# Patient Record
Sex: FEMALE | Race: WHITE | Employment: OTHER | URBAN - METROPOLITAN AREA
[De-identification: names, ages, dates, MRNs, and addresses within clinical notes are randomized per-mention and may not be internally consistent; named-entity substitution may affect disease eponyms.]

---

## 2017-07-26 ENCOUNTER — OFFICE VISIT (OUTPATIENT)
Dept: FAMILY MEDICINE | Facility: CLINIC | Age: 37
End: 2017-07-26
Payer: MEDICAID

## 2017-07-26 VITALS
WEIGHT: 237.6 LBS | HEIGHT: 66 IN | DIASTOLIC BLOOD PRESSURE: 94 MMHG | HEART RATE: 70 BPM | SYSTOLIC BLOOD PRESSURE: 132 MMHG | TEMPERATURE: 98.1 F | BODY MASS INDEX: 38.18 KG/M2

## 2017-07-26 DIAGNOSIS — E06.3 HASHIMOTO'S THYROIDITIS: Primary | ICD-10-CM

## 2017-07-26 DIAGNOSIS — I10 BENIGN ESSENTIAL HYPERTENSION: ICD-10-CM

## 2017-07-26 LAB
ANION GAP SERPL CALCULATED.3IONS-SCNC: 6 MMOL/L (ref 3–14)
BUN SERPL-MCNC: 19 MG/DL (ref 7–30)
CALCIUM SERPL-MCNC: 8.8 MG/DL (ref 8.5–10.1)
CHLORIDE SERPL-SCNC: 106 MMOL/L (ref 94–109)
CO2 SERPL-SCNC: 27 MMOL/L (ref 20–32)
CREAT SERPL-MCNC: 0.98 MG/DL (ref 0.52–1.04)
GFR SERPL CREATININE-BSD FRML MDRD: 64 ML/MIN/1.7M2
GLUCOSE SERPL-MCNC: 90 MG/DL (ref 70–99)
POTASSIUM SERPL-SCNC: 4.4 MMOL/L (ref 3.4–5.3)
SODIUM SERPL-SCNC: 139 MMOL/L (ref 133–144)
TSH SERPL DL<=0.005 MIU/L-ACNC: 1.45 MU/L (ref 0.4–4)

## 2017-07-26 PROCEDURE — 36415 COLL VENOUS BLD VENIPUNCTURE: CPT | Performed by: FAMILY MEDICINE

## 2017-07-26 PROCEDURE — 99213 OFFICE O/P EST LOW 20 MIN: CPT | Performed by: FAMILY MEDICINE

## 2017-07-26 PROCEDURE — 80048 BASIC METABOLIC PNL TOTAL CA: CPT | Performed by: FAMILY MEDICINE

## 2017-07-26 PROCEDURE — 84443 ASSAY THYROID STIM HORMONE: CPT | Performed by: FAMILY MEDICINE

## 2017-07-26 RX ORDER — THYROID 30 MG/1
30 TABLET ORAL DAILY
Qty: 60 TABLET | Refills: 3 | Status: SHIPPED | OUTPATIENT
Start: 2017-07-26 | End: 2017-08-23

## 2017-07-26 RX ORDER — LOSARTAN POTASSIUM 25 MG/1
25 TABLET ORAL DAILY
Qty: 90 TABLET | Refills: 1 | Status: SHIPPED | OUTPATIENT
Start: 2017-07-26 | End: 2018-09-25

## 2017-07-26 NOTE — MR AVS SNAPSHOT
"              After Visit Summary   7/26/2017    Yumi Bhatti    MRN: [id number]           Patient Information     Date Of Birth          1980        Visit Information        Provider Department      7/26/2017 9:00 AM Ora Ivy MD [facility]        Today's Diagnoses     Hashimoto's thyroiditis    -  1    Benign essential hypertension          Care Instructions    Start the armour thyroid if it is too expensive I will send in the levothyroxine   Start the losartan and stop the lisinopril/zestril.  Recheck the blood pressure and the thyroid in 3 and 1/2 weeks   I will put in the lab orders for the thyroid . You can do a nurse only or pharmacy visit for the blood pressure           Follow-ups after your visit        Who to contact     Normal or non-critical lab and imaging results will be communicated to you by [employer], letter or phone within 4 business days after the clinic has received the results. If you do not hear from us within 7 days, please contact the clinic through [employer] or phone. If you have a critical or abnormal lab result, we will notify you by phone as soon as possible.  Submit refill requests through [employer] or call your pharmacy and they will forward the refill request to us. Please allow 3 business days for your refill to be completed.          If you need to speak with a  for additional information , please call: [phone]             Additional Information About Your Visit        [employer] Information     [employer] lets you send messages to your doctor, view your test results, renew your prescriptions, schedule appointments and more. To sign up, go to [email or web address] . Click on \"Log in\" on the left side of the screen, which will take you to the Welcome page. Then click on \"Sign up Now\" on the right side of the page.     You will be asked to enter the access code listed below, as well as some personal information. Please follow the directions " "to create your username and password.     Your access code is: [email or web address]  Expires: 10/24/2017 10:01 AM     Your access code will  in 90 days. If you need help or a new code, please call your [place] clinic or [phone].        Care EveryWhere ID     This is your Care EveryWhere ID. This could be used by other organizations to access your [place] medical records  [id number]        Your Vitals Were     Pulse Temperature Height BMI (Body Mass Index)          70 98.1  F (36.7  C) (Tympanic) 5' 6.25\" (1.683 m) 38.06 kg/m2         Blood Pressure from Last 3 Encounters:   17 (!) 132/94   16 130/88   06/10/16 (!) 156/100    Weight from Last 3 Encounters:   17 237 lb 9.6 oz (107.8 kg)   16 231 lb 3.2 oz (104.9 kg)   06/10/16 227 lb (103 kg)              We Performed the Following     BASIC METABOLIC PANEL     TSH with free T4 reflex          Today's Medication Changes          These changes are accurate as of: 17 10:01 AM.  If you have any questions, ask your nurse or doctor.               Start taking these medicines.        Dose/Directions    losartan 25 MG tablet   Commonly known as:  COZAAR   Used for:  Benign essential hypertension   Started by:  Ora Ivy MD        Dose:  25 mg   Take 1 tablet (25 mg) by mouth daily   Quantity:  90 tablet   Refills:  1       thyroid 30 MG tablet   Commonly known as:  ARMOUR   Used for:  Hashimoto's thyroiditis   Started by:  Ora Ivy MD        Dose:  30 mg   Take 1 tablet (30 mg) by mouth daily Take 1-2 tablets per day as directed   Quantity:  60 tablet   Refills:  3            Where to get your medicines      These medications were sent to [facility] - [address]  [address] [address]     Phone:  [phone]     losartan 25 MG tablet    thyroid 30 MG tablet                Primary Care Provider    None Specified       No primary provider on file.        Equal Access to " Services     [facility]: Hadii williams tee juan Soadelaidaali, waaxda luqadaha, qaybta kaalmada adegilmerzay, aleja parkerblackyahir flores . So [facility] [phone].    ATENCIÓN: Si jackie viera, tiene a zuleta disposición servicios gratuitos de asistencia lingüística. Llame al [phone].    We comply with applicable federal civil rights laws and [place] laws. We do not discriminate on the basis of race, color, national origin, age, disability sex, sexual orientation or gender identity.            Thank you!     Thank you for choosing [facility]  for your care. Our goal is always to provide you with excellent care. Hearing back from our patients is one way we can continue to improve our services. Please take a few minutes to complete the written survey that you may receive in the mail after your visit with us. Thank you!             Your Updated Medication List - Protect others around you: Learn how to safely use, store and throw away your medicines at www.disposemymeds.org.          This list is accurate as of: 7/26/17 10:01 AM.  Always use your most recent med list.                   Brand Name Dispense Instructions for use Diagnosis    LEVOTHYROXINE SODIUM PO      Take 50 mcg by mouth        * lisinopril 5 MG tablet    PRINIVIL/ZESTRIL    30 tablet    Take 1 tablet (5 mg) by mouth daily    Elevated BP       * lisinopril 10 MG tablet    PRINIVIL/ZESTRIL    90 tablet    Take 1 tablet (10 mg) by mouth daily    Benign essential hypertension       losartan 25 MG tablet    COZAAR    90 tablet    Take 1 tablet (25 mg) by mouth daily    Benign essential hypertension       norethindrone 0.35 MG per tablet    MICRONOR    60 tablet    Take 1 tablet (0.35 mg) by mouth daily    Pelvic pain in female       thyroid 30 MG tablet    ARMOUR    60 tablet    Take 1 tablet (30 mg) by mouth daily Take 1-2 tablets per day as directed    Hashimoto's thyroiditis       * Notice:  This list has 2 medication(s) that are the  same as other medications prescribed for you. Read the directions carefully, and ask your doctor or other care provider to review them with you.

## 2017-07-26 NOTE — PATIENT INSTRUCTIONS
Start the armour thyroid if it is too expensive I will send in the levothyroxine   Start the losartan and stop the lisinopril/zestril.  Recheck the blood pressure and the thyroid in 3 and 1/2 weeks   I will put in the lab orders for the thyroid . You can do a nurse only or pharmacy visit for the blood pressure

## 2017-07-26 NOTE — PROGRESS NOTES
SUBJECTIVE:                                                    Yumi Bhatti is a 36 year old female who presents to clinic today for the following health issues:    Had not been taking blood pressure medication, was getting headaches. Started medication again headaches are gone.   Lives in [place], comes back once a year for a few weeks, works for a [employer].   Also seen at clinic in [place] [facility] [place] [facility].     Hypertension Follow-up    Outpatient blood pressures are being checked at home.  Results are 155/105.    Low Salt Diet: low salt  BP Readings from Last 6 Encounters:   07/26/17 (!) 132/94   06/14/16 130/88   06/10/16 (!) 156/100   06/05/15 116/70   05/29/15 (!) 148/108   09/16/14 122/70     Hypothyroidism Follow-up    Since last visit, patient describes the following symptoms: Stopped taking medication after normal labs last year     Has hashimotos  Needs labs and would be interested in starting armour as she is gaining weight and her blood pressure control has been poorer         Component      Latest Ref Rng & Units 6/14/2016   TSH      0.40 - 4.00 mU/L 1.53         Amount of exercise or physical activity: 3 days per week    Problems taking medications regularly: Yes,  problems remembering to take    Medication side effects: coughing at night sometimes.     Diet: regular (no restrictions) and low salt      Problem list and histories reviewed & adjusted, as indicated.  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   [place] is available there         Blood pressure started after the 3rd son and then with 4th child had preeclampsia and she has been on lisinopril she has been back on her medication and it has improved   She has the cough when she takes the lisinopril and it goes away  when she stops it or takes the zestril from [place]    Patient Active Problem List   Diagnosis     Obesity     Sacroiliitis, not elsewhere classified (H)     Hemorrhoids     Insomnia     CARDIOVASCULAR SCREENING; LDL GOAL LESS THAN 160     Health Care Home     [facility] recommends twice weekly BPP's with delivery at 37 weeks=13 with TL     Bulge of lumbar disc without myelopathy     CKD (chronic kidney disease) stage 2, GFR 60-89 ml/min     Hypertension goal BP (blood pressure) < 140/90     Migraine     Coccydynia     Hemorrhoids in pregnancy with baby delivered     Major depression in complete remission (H)     Dysuria     Essential hypertension with goal blood pressure less than 140/90     Hypothyroidism, unspecified type     Past Surgical History:   Procedure Laterality Date     C/SECTION, LOW TRANSVERSE  2006     C/SECTION, LOW TRANSVERSE  2007    , Low Transverse     C/SECTION, LOW TRANSVERSE  06/17/10    , Low Transverse     C/SECTION, LOW TRANSVERSE  13    ERCS with TL     HC TOOTH EXTRACTION W/FORCEP      age 22.     LAPAROSCOPIC SALPINGECTOMY  3/1/2012    Procedure:LAPAROSCOPIC SALPINGECTOMY; Laparoscopic Left Salpingectomy; Surgeon:LUIS GARCIA; Location:[facility] OR     TUBAL LIGATION  14     TUBAL/ECTOPIC PREGNANCY         Social History   Substance Use Topics     Smoking status: Never Smoker     Smokeless tobacco: Never Used      Comment: no smokers in the household     Alcohol use Yes      Comment: none since pg     Family History   Problem Relation Age of Onset     DIABETES Mother      adult     Depression Mother      Hypertension Mother      CEREBROVASCULAR DISEASE Mother      has had three strokes in  age 30's.     Alcohol/Drug Mother      alcoholic     Psychotic Disorder Mother      Alcohol/Drug Father      alcoholic and drug abuse     Depression Maternal Grandmother      Alcohol/Drug Maternal Grandmother      DIABETES Maternal Grandfather      Depression  "Maternal Grandfather      Arthritis Maternal Grandfather      Alcohol/Drug Maternal Grandfather      Depression Brother      Alcohol/Drug Brother      has seven half  brothers and all have alcohol and drug abuse.     Alcohol/Drug Paternal Grandmother      Alcohol/Drug Paternal Grandfather      Psychotic Disorder Brother              ROS:  Constitutional, HEENT, cardiovascular, pulmonary, gi and gu systems are negative, except as otherwise noted.      OBJECTIVE:                                                    BP (!) 132/94 (BP Location: Right arm, Patient Position: Chair, Cuff Size: Adult Large)  Pulse 70  Temp 98.1  F (36.7  C) (Tympanic)  Ht 5' 6.25\" (1.683 m)  Wt 237 lb 9.6 oz (107.8 kg)  BMI 38.06 kg/m2 Body mass index is 38.06 kg/(m^2).   GENERAL APPEARANCE: healthy, alert and no distress  HENT: ear canals and TM's normal and nose and mouth without ulcers or lesions  NECK: no adenopathy, no asymmetry, masses, or scars and thyroid normal to palpation  RESP: lungs clear to auscultation - no rales, rhonchi or wheezes  CV: regular rates and rhythm, normal S1 S2, no S3 or S4 and no murmur, click or rub  ABDOMEN: soft, nontender, without hepatosplenomegaly or masses, bowel sounds normal and liver span normal to percussion  PSYCH: mentation appears normal and affect normal/bright  MENTAL STATUS EXAM:  Appearance/Behavior: No apparent distress, Neatly groomed and Dressed appropriately for weather  Speech: Normal  Mood/Affect: normal affect  Insight: Adequate         ASSESSMENT/PLAN:                                                      1. Hashimoto's thyroiditis  Results for orders placed or performed in visit on 07/26/17   BASIC METABOLIC PANEL   Result Value Ref Range    Sodium 139 133 - 144 mmol/L    Potassium 4.4 3.4 - 5.3 mmol/L    Chloride 106 94 - 109 mmol/L    Carbon Dioxide 27 20 - 32 mmol/L    Anion Gap 6 3 - 14 mmol/L    Glucose 90 70 - 99 mg/dL    Urea Nitrogen 19 7 - 30 mg/dL    Creatinine 0.98 0.52 " - 1.04 mg/dL    GFR Estimate 64 >60 mL/min/1.7m2    GFR Estimate If Black 78 >60 mL/min/1.7m2    Calcium 8.8 8.5 - 10.1 mg/dL   TSH with free T4 reflex   Result Value Ref Range    TSH 1.45 0.40 - 4.00 mU/L       - TSH with free T4 reflex  - thyroid (ARMOUR) 30 MG tablet; Take 1 tablet (30 mg) by mouth daily Take 1-2 tablets per day as directed  Dispense: 60 tablet; Refill: 3    2. Benign essential hypertension  Patient Instructions   Start the armour thyroid if it is too expensive I will send in the levothyroxine   Start the losartan and stop the lisinopril/zestril.  Recheck the blood pressure and the thyroid in 3 and 1/2 weeks   I will put in the lab orders for the thyroid . You can do a nurse only or pharmacy visit for the blood pressure       - BASIC METABOLIC PANEL  - losartan (COZAAR) 25 MG tablet; Take 1 tablet (25 mg) by mouth daily  Dispense: 90 tablet; Refill: 1  . reports that she has never smoked. She has never used smokeless tobacco.        Weight management plan: Discussed healthy diet and exercise guidelines and patient will follow up in 12 months in clinic to re-evaluate.  rOa Ivy M.D.  [facility]

## 2017-07-27 ASSESSMENT — PATIENT HEALTH QUESTIONNAIRE - PHQ9: SUM OF ALL RESPONSES TO PHQ QUESTIONS 1-9: 4

## 2017-08-14 ENCOUNTER — OFFICE VISIT (OUTPATIENT)
Dept: FAMILY MEDICINE | Facility: CLINIC | Age: 37
End: 2017-08-14
Payer: MEDICAID

## 2017-08-14 VITALS
BODY MASS INDEX: 38.77 KG/M2 | TEMPERATURE: 99.3 F | HEART RATE: 76 BPM | WEIGHT: 242 LBS | SYSTOLIC BLOOD PRESSURE: 151 MMHG | DIASTOLIC BLOOD PRESSURE: 102 MMHG

## 2017-08-14 DIAGNOSIS — H65.197: Primary | ICD-10-CM

## 2017-08-14 PROCEDURE — 99213 OFFICE O/P EST LOW 20 MIN: CPT | Performed by: PHYSICIAN ASSISTANT

## 2017-08-14 RX ORDER — CEFDINIR 300 MG/1
300 CAPSULE ORAL 2 TIMES DAILY
Qty: 20 CAPSULE | Refills: 0 | Status: SHIPPED | OUTPATIENT
Start: 2017-08-14 | End: 2017-08-23

## 2017-08-14 NOTE — MR AVS SNAPSHOT
"              After Visit Summary   2017    Yumi Bhatti    MRN: [id number]           Patient Information     Date Of Birth          1980        Visit Information        Provider Department      2017 11:40 AM Nan Jarrett PA-C [facility]        Today's Diagnoses     Other recurrent acute nonsuppurative otitis media    -  1       Follow-ups after your visit        Who to contact     Normal or non-critical lab and imaging results will be communicated to you by [employer], letter or phone within 4 business days after the clinic has received the results. If you do not hear from us within 7 days, please contact the clinic through [employer] or phone. If you have a critical or abnormal lab result, we will notify you by phone as soon as possible.  Submit refill requests through [employer] or call your pharmacy and they will forward the refill request to us. Please allow 3 business days for your refill to be completed.          If you need to speak with a  for additional information , please call: [phone]             Additional Information About Your Visit        [employer] Information     [employer] lets you send messages to your doctor, view your test results, renew your prescriptions, schedule appointments and more. To sign up, go to [email or web address] . Click on \"Log in\" on the left side of the screen, which will take you to the Welcome page. Then click on \"Sign up Now\" on the right side of the page.     You will be asked to enter the access code listed below, as well as some personal information. Please follow the directions to create your username and password.     Your access code is: [email or web address]  Expires: 10/24/2017 10:01 AM     Your access code will  in 90 days. If you need help or a new code, please call your [facility] or [phone].        Care EveryWhere ID     This is your Care EveryWhere ID. This could be used by other organizations to " access your [place] medical records  [id number]        Your Vitals Were     Pulse Temperature BMI (Body Mass Index)             76 99.3  F (37.4  C) (Tympanic) 38.77 kg/m2          Blood Pressure from Last 3 Encounters:   08/14/17 (!) 151/102   07/26/17 (!) 132/94   06/14/16 130/88    Weight from Last 3 Encounters:   08/14/17 242 lb (109.8 kg)   07/26/17 237 lb 9.6 oz (107.8 kg)   06/14/16 231 lb 3.2 oz (104.9 kg)              Today, you had the following     No orders found for display         Today's Medication Changes          These changes are accurate as of: 8/14/17 11:57 AM.  If you have any questions, ask your nurse or doctor.               Start taking these medicines.        Dose/Directions    cefdinir 300 MG capsule   Commonly known as:  OMNICEF   Used for:  Other recurrent acute nonsuppurative otitis media   Started by:  Nan Jarrett PA-C        Dose:  300 mg   Take 1 capsule (300 mg) by mouth 2 times daily for 10 days   Quantity:  20 capsule   Refills:  0            Where to get your medicines      These medications were sent to [facility] - [address]  [address] [place] [address]     Phone:  [phone]     cefdinir 300 MG capsule                Primary Care Provider    None Specified       No primary provider on file.        Equal Access to Services     [facility] AH: Hadii williams martinez Sonancie, waaxda luqadaha, qaybta kaalmada john, aleja flores . So [facility] [phone].    ATENCIÓN: Si habla español, tiene a zuleta disposición servicios gratuitos de asistencia lingüística. Llame al [phone].    We comply with applicable federal civil rights laws and [place] laws. We do not discriminate on the basis of race, color, national origin, age, disability sex, sexual orientation or gender identity.            Thank you!     [facility]. Our goal is always to  provide you with excellent care. Hearing back from our patients is one way we can continue to improve our services. Please take a few minutes to complete the written survey that you may receive in the mail after your visit with us. Thank you!             Your Updated Medication List - Protect others around you: Learn how to safely use, store and throw away your medicines at www.disposemymeds.org.          This list is accurate as of: 8/14/17 11:57 AM.  Always use your most recent med list.                   Brand Name Dispense Instructions for use Diagnosis    cefdinir 300 MG capsule    OMNICEF    20 capsule    Take 1 capsule (300 mg) by mouth 2 times daily for 10 days    Other recurrent acute nonsuppurative otitis media       LEVOTHYROXINE SODIUM PO      Take 50 mcg by mouth        * lisinopril 5 MG tablet    PRINIVIL/ZESTRIL    30 tablet    Take 1 tablet (5 mg) by mouth daily    Elevated BP       * lisinopril 10 MG tablet    PRINIVIL/ZESTRIL    90 tablet    Take 1 tablet (10 mg) by mouth daily    Benign essential hypertension       losartan 25 MG tablet    COZAAR    90 tablet    Take 1 tablet (25 mg) by mouth daily    Benign essential hypertension       norethindrone 0.35 MG per tablet    MICRONOR    60 tablet    Take 1 tablet (0.35 mg) by mouth daily    Pelvic pain in female       thyroid 30 MG tablet    ARMOUR    60 tablet    Take 1 tablet (30 mg) by mouth daily Take 1-2 tablets per day as directed    Hashimoto's thyroiditis       * Notice:  This list has 2 medication(s) that are the same as other medications prescribed for you. Read the directions carefully, and ask your doctor or other care provider to review them with you.

## 2017-08-14 NOTE — PROGRESS NOTES
SUBJECTIVE:  Yumi Bhatti is a 36 year old female who presents with the following concerns;              Symptoms: cc Present Absent Comment   Fever/Chills   x    Fatigue   x    Muscle Aches   x    Eye Irritation   x    Sneezing   x    Nasal Myke/Drg   x    Sinus Pressure/Pain   x    Loss of smell   x    Dental pain   x    Sore Throat   x    Swollen Glands   x    Ear Pain/Fullness  x     Cough   x    Wheeze   x    Chest Pain   x    Shortness of breath   x    Rash   x    Other   x      Symptom duration:  3 days    Sympom severity:  Mild - moderate    Treatments tried:  IBU   Contacts:  none       Medications updated and reviewed.  Past, family and surgical history is updated and reviewed in the record.    ROS:  Other than noted above, general, HEENT, respiratory, cardiac and gastrointestinal systems are negative.    OBJECTIVE:  BP (!) 151/102  Pulse 76  Temp 99.3  F (37.4  C) (Tympanic)  Wt 242 lb (109.8 kg)  BMI 38.77 kg/m2   GENERAL: Pleasant and interactive.  Alert and oriented x 3.  No acute distress.   HEENT: Eyes clear. Right TM and canal clear. Left TM with opacity and erythema of the TM.  SKIN:  Only benign skin findings. No unusual rashes or suspicious skin lesions noted. Nails appear normal.        Assessment:    Encounter Diagnosis   Name Primary?     Other recurrent acute nonsuppurative otitis media Yes     Plan:   Orders Placed This Encounter     cefdinir (OMNICEF) 300 MG capsule       Supportive therapy also discussed. Follow up if symptoms fail to improve or worsen.      The patient was in agreement with the plan today and had no questions or concerns prior to leaving the clinic.     Nan Jarrett PA-C

## 2017-08-14 NOTE — NURSING NOTE
"Chief Complaint   Patient presents with     Otalgia       Initial BP (!) 151/102  Pulse 76  Temp 99.3  F (37.4  C) (Tympanic)  Wt 242 lb (109.8 kg)  BMI 38.77 kg/m2 Estimated body mass index is 38.77 kg/(m^2) as calculated from the following:    Height as of 7/26/17: 5' 6.25\" (1.683 m).    Weight as of this encounter: 242 lb (109.8 kg).  Medication Reconciliation: complete     Annemarie Mendez CMA      "

## 2017-08-21 ENCOUNTER — OFFICE VISIT (OUTPATIENT)
Dept: FAMILY MEDICINE | Facility: OTHER | Age: 37
End: 2017-08-21
Payer: MEDICAID

## 2017-08-21 VITALS
RESPIRATION RATE: 16 BRPM | BODY MASS INDEX: 38.45 KG/M2 | TEMPERATURE: 97.7 F | OXYGEN SATURATION: 97 % | HEART RATE: 81 BPM | DIASTOLIC BLOOD PRESSURE: 100 MMHG | WEIGHT: 240 LBS | SYSTOLIC BLOOD PRESSURE: 146 MMHG

## 2017-08-21 DIAGNOSIS — I10 HYPERTENSION GOAL BP (BLOOD PRESSURE) < 140/90: ICD-10-CM

## 2017-08-21 DIAGNOSIS — E03.9 HYPOTHYROIDISM, UNSPECIFIED TYPE: ICD-10-CM

## 2017-08-21 DIAGNOSIS — H65.192 ACUTE MUCOID OTITIS MEDIA OF LEFT EAR: Primary | ICD-10-CM

## 2017-08-21 PROCEDURE — 99214 OFFICE O/P EST MOD 30 MIN: CPT | Performed by: PHYSICIAN ASSISTANT

## 2017-08-21 NOTE — MR AVS SNAPSHOT
"              After Visit Summary   8/21/2017    Yumi Bhatti    MRN: [id number]           Patient Information     Date Of Birth          1980        Visit Information        Provider Department      8/21/2017 10:30 AM Gerardo Layton PA-C [facility]        Today's Diagnoses     Acute mucoid otitis media of left ear    -  1      Care Instructions    Will start you on Augmentin to take twice daily for 10 days.  Continue with ibuprofen and use cool compresses over the ear.   Increase the Losartan to 50 mg daily instead of 25.  Follow up on Friday for a recheck of your ear and blood pressure.            Follow-ups after your visit        Who to contact     If you have questions or need follow up information about today's clinic visit or your schedule please contact [facility] directly at [phone].  Normal or non-critical lab and imaging results will be communicated to you by Beamlyhart, letter or phone within 4 business days after the clinic has received the results. If you do not hear from us within 7 days, please contact the clinic through Beamlyhart or phone. If you have a critical or abnormal lab result, we will notify you by phone as soon as possible.  Submit refill requests through NeoDiagnostix or call your pharmacy and they will forward the refill request to us. Please allow 3 business days for your refill to be completed.          Additional Information About Your Visit        MyChart Information     NeoDiagn[employer]ix lets you send messages to your doctor, view your test results, renew your prescriptions, schedule appointments and more. To sign up, go to [email or web address] . Click on \"Log in\" on the left side of the screen, which will take you to the Welcome page. Then click on \"Sign up Now\" on the right side of the page.     You will be asked to enter the access code listed below, as well as some personal information. Please follow the directions to create your username " and password.     Your access code is: [email or web address]  Expires: 10/24/2017 10:01 AM     Your access code will  in 90 days. If you need help or a new code, please call your [place] clinic or [phone].        Care EveryWhere ID     This is your Care EveryWhere ID. This could be used by other organizations to access your [place] medical records  [id number]        Your Vitals Were     Pulse Temperature Respirations Pulse Oximetry BMI (Body Mass Index)       81 97.7  F (36.5  C) (Temporal) 16 97% 38.45 kg/m2        Blood Pressure from Last 3 Encounters:   17 (!) 146/100   17 (!) 151/102   17 (!) 132/94    Weight from Last 3 Encounters:   17 240 lb (108.9 kg)   17 242 lb (109.8 kg)   17 237 lb 9.6 oz (107.8 kg)              Today, you had the following     No orders found for display         Today's Medication Changes          These changes are accurate as of: 17 10:45 AM.  If you have any questions, ask your nurse or doctor.               Start taking these medicines.        Dose/Directions    amoxicillin-clavulanate 875-125 MG per tablet   Commonly known as:  AUGMENTIN   Used for:  Acute mucoid otitis media of left ear   Started by:  Gerardo Layton PA-C        Dose:  1 tablet   Take 1 tablet by mouth 2 times daily   Quantity:  20 tablet   Refills:  0         Stop taking these medicines if you haven't already. Please contact your care team if you have questions.     lisinopril 10 MG tablet   Commonly known as:  PRINIVIL/ZESTRIL   Stopped by:  Gerardo Layton PA-C           lisinopril 5 MG tablet   Commonly known as:  PRINIVIL/ZESTRIL   Stopped by:  Gerardo Layton PA-C           norethindrone 0.35 MG per tablet   Commonly known as:  MICRONOR   Stopped by:  Gerardo Layton PA-C                Where to get your medicines      These medications were sent to [facility] [place], [place] - [address]  [address] [facility]  [address]     Phone:  [phone]     amoxicillin-clavulanate 875-125 MG per tablet                Primary Care Provider    None Specified       No primary provider on file.        Equal Access to Services     HUYEN CHENG : Hadii aad ku hadgeriamie Kebede, jodi deviluis f, jeff lopez, aleja khan magykandice evans sandra roberto. So [facility] [phone].    ATENCIÓN: Si habla español, tiene a zuleta disposición servicios gratuitos de asistencia lingüística. Llame al [phone].    We comply with applicable federal civil rights laws and [place] laws. We do not discriminate on the basis of race, color, national origin, age, disability sex, sexual orientation or gender identity.            Thank you!     Thank you for choosing [facility]  for your care. Our goal is always to provide you with excellent care. Hearing back from our patients is one way we can continue to improve our services. Please take a few minutes to complete the written survey that you may receive in the mail after your visit with us. Thank you!             Your Updated Medication List - Protect others around you: Learn how to safely use, store and throw away your medicines at www.disposemymeds.org.          This list is accurate as of: 8/21/17 10:45 AM.  Always use your most recent med list.                   Brand Name Dispense Instructions for use Diagnosis    amoxicillin-clavulanate 875-125 MG per tablet    AUGMENTIN    20 tablet    Take 1 tablet by mouth 2 times daily    Acute mucoid otitis media of left ear       cefdinir 300 MG capsule    OMNICEF    20 capsule    Take 1 capsule (300 mg) by mouth 2 times daily for 10 days    Other recurrent acute nonsuppurative otitis media       LEVOTHYROXINE SODIUM PO      Take 50 mcg by mouth        losartan 25 MG tablet    COZAAR    90 tablet    Take 1 tablet (25 mg) by mouth daily    Benign essential hypertension       thyroid 30 MG tablet    ARMOUR    60 tablet    Take 1 tablet (30 mg)  by mouth daily Take 1-2 tablets per day as directed    Hashimoto's thyroiditis

## 2017-08-21 NOTE — NURSING NOTE
"Chief Complaint   Patient presents with     Ear Problem       Initial BP (!) 146/100 (BP Location: Right arm, Patient Position: Chair, Cuff Size: Adult Regular)  Pulse 81  Temp 97.7  F (36.5  C) (Temporal)  Resp 16  Wt 240 lb (108.9 kg)  SpO2 97%  BMI 38.45 kg/m2 Estimated body mass index is 38.45 kg/(m^2) as calculated from the following:    Height as of 7/26/17: 5' 6.25\" (1.683 m).    Weight as of this encounter: 240 lb (108.9 kg).  Medication Reconciliation: complete     Zuly Cho CMA      "

## 2017-08-21 NOTE — PATIENT INSTRUCTIONS
Will start you on Augmentin to take twice daily for 10 days.  Continue with ibuprofen and use cool compresses over the ear.   Increase the Losartan to 50 mg daily instead of 25.  Follow up on Friday for a recheck of your ear and blood pressure.

## 2017-08-21 NOTE — PROGRESS NOTES
SUBJECTIVE:                                                    Yumi Bhatti is a 36 year old female who presents to clinic today for the following health issues:    HPI    Concern - Ear problem  Onset: 2 weeks    Description:   Both ears    Progression of Symptoms:  intermittent    Accompanying Signs & Symptoms:  Ear pain, loss of hearing    Previous history of similar problem:   Previously hospitalized for an ear infection two years ago    Therapies Tried and outcome: cefdinir    Patient was seen at [facility] and was diagnosed with ear infection on the left on 8/14. She was given a 10 day course of Cefdinir but her symptoms have not improved with continued ear fullness and pain on both sides. She is getting on a plane to go back to [facility] next week and wants to make sure this is gone before that.     She also had her blood pressure medication changed last month from lisinopril to losartan since she was having a cough with lisinopril. She states she has been tolerating the new medication well. She was also started on levothyroxine due to her history of thyroiditis and hypothyroidism.     Problem list and histories reviewed & adjusted, as indicated.  Additional history: none    ROS:  GENERAL: Denies fever, fatigue, weakness, weight gain, or weight loss.  HEENT: Eyes-Denies pain, redness, loss of vision, double or blurred vision.     Ears/Nose- +Bilateral ear pain and fullness. Denies tinnitus, loss of hearing, epistaxis, decreased sense of smell. Denies loss of sense of taste, dry mouth, or sore throat.   CARDIOVASCULAR: Denies chest pain, shortness of breath, irregular heartbeats,  palpitations, or edema.  RESPIRATORY: Denies cough, hemoptysis, and shortness of breath.  NEUROLOGIC: Denies headache, fainting, dizziness, memory loss, numbness, tingling, or seizures.    OBJECTIVE:     BP (!) 146/100 (BP Location: Right arm, Patient Position: Chair, Cuff Size: Adult Regular)  Pulse 81  Temp 97.7  F  (36.5  C) (Temporal)  Resp 16  Wt 240 lb (108.9 kg)  SpO2 97%  BMI 38.45 kg/m2  Body mass index is 38.45 kg/(m^2).  GENERAL: healthy, alert and no distress  EYES: Eyes grossly normal to inspection, PERRL and conjunctivae and sclerae normal  HENT: ear canals and TM's normal, nose and mouth without ulcers or lesions  NECK: no adenopathy, no asymmetry, masses, or scars and thyroid normal to palpation  RESP: lungs clear to auscultation - no rales, rhonchi or wheezes  CV: regular rate and rhythm, normal S1 S2, no S3 or S4, no murmur, click or rub, no peripheral edema and peripheral pulses strong    ASSESSMENT/PLAN:       ICD-10-CM    1. Acute mucoid otitis media of left ear H65.112 amoxicillin-clavulanate (AUGMENTIN) 875-125 MG per tablet   2. Hypothyroidism, unspecified type E03.9    3. Hypertension goal BP (blood pressure) < 140/90 I10        She is not responding to Omnicef so will switch to Augmentin instead for 10 days. I would like to see her again on Friday for recheck since she will be going back to [facility] on a long plan ride next week and I want to make sure her infection is improving.     Her blood pressure is quite high today, initial was 146/100 and repeat was 160/110. She is asymptomatic but I recommend she increase her losartan to 50 mg daily and we will recheck her BP on Friday. If she has any chest pain, shortness of breath, or a severe headache, she should be seen right away.     Will recheck her thyroid on Friday as well and she was recently restarted on levothyroxine.     Gerardo Layton PA-C  [facility]

## 2017-08-23 ENCOUNTER — OFFICE VISIT (OUTPATIENT)
Dept: OTOLARYNGOLOGY | Facility: OTHER | Age: 37
End: 2017-08-23
Payer: MEDICAID

## 2017-08-23 ENCOUNTER — OFFICE VISIT (OUTPATIENT)
Dept: AUDIOLOGY | Facility: OTHER | Age: 37
End: 2017-08-23
Payer: MEDICAID

## 2017-08-23 ENCOUNTER — TELEPHONE (OUTPATIENT)
Dept: FAMILY MEDICINE | Facility: OTHER | Age: 37
End: 2017-08-23

## 2017-08-23 VITALS — HEIGHT: 66 IN | BODY MASS INDEX: 38.57 KG/M2 | WEIGHT: 240 LBS | TEMPERATURE: 98.2 F

## 2017-08-23 DIAGNOSIS — H69.92 DISORDER OF LEFT EUSTACHIAN TUBE: ICD-10-CM

## 2017-08-23 DIAGNOSIS — H90.12 CONDUCTIVE HEARING LOSS OF LEFT EAR WITH UNRESTRICTED HEARING OF RIGHT EAR: Primary | ICD-10-CM

## 2017-08-23 DIAGNOSIS — H65.192 ACUTE MUCOID OTITIS MEDIA OF LEFT EAR: Primary | ICD-10-CM

## 2017-08-23 DIAGNOSIS — H66.002 ACUTE SUPPURATIVE OTITIS MEDIA OF LEFT EAR WITHOUT SPONTANEOUS RUPTURE OF TYMPANIC MEMBRANE, RECURRENCE NOT SPECIFIED: Primary | ICD-10-CM

## 2017-08-23 PROCEDURE — 92567 TYMPANOMETRY: CPT | Performed by: AUDIOLOGIST

## 2017-08-23 PROCEDURE — 92557 COMPREHENSIVE HEARING TEST: CPT | Performed by: AUDIOLOGIST

## 2017-08-23 PROCEDURE — 69420 INCISION OF EARDRUM: CPT | Mod: LT | Performed by: OTOLARYNGOLOGY

## 2017-08-23 PROCEDURE — 99207 ZZC NO CHARGE LOS: CPT | Performed by: AUDIOLOGIST

## 2017-08-23 PROCEDURE — 99203 OFFICE O/P NEW LOW 30 MIN: CPT | Mod: 25 | Performed by: OTOLARYNGOLOGY

## 2017-08-23 RX ORDER — PREDNISOLONE SODIUM PHOSPHATE 10 MG/ML
SOLUTION/ DROPS OPHTHALMIC
Qty: 5 ML | Refills: 1 | Status: SHIPPED | OUTPATIENT
Start: 2017-08-23 | End: 2017-08-30

## 2017-08-23 RX ORDER — OFLOXACIN 3 MG/ML
5 SOLUTION AURICULAR (OTIC) 2 TIMES DAILY
Qty: 5 ML | Refills: 0 | Status: SHIPPED | OUTPATIENT
Start: 2017-08-23 | End: 2017-09-02

## 2017-08-23 ASSESSMENT — PAIN SCALES - GENERAL: PAINLEVEL: MILD PAIN (3)

## 2017-08-23 NOTE — MR AVS SNAPSHOT
"              After Visit Summary   8/23/2017    Yumi Bhatti    MRN: [id number]           Patient Information     Date Of Birth          1980        Visit Information        Provider Department      8/23/2017 11:15 AM Trip Manzo AuD [facility]        Today's Diagnoses     Conductive hearing loss of left ear with unrestricted hearing of right ear    -  1    Disorder of left eustachian tube           Follow-ups after your visit        Your next 10 appointments already scheduled     Aug 30, 2017  1:15 PM CDT   [facility] Palmer MD   [facility]    [address]  [place] [place] [id number] [phone]              Who to contact     If you have questions or need follow up information about today's clinic visit or your schedule please contact [facility] directly at [phone].  Normal or non-critical lab and imaging results will be communicated to you by MyChart, letter or phone within 4 business days after the clinic has received the results. If you do not hear from us within 7 days, please contact the clinic through Awarepointhart or phone. If you have a critical or abnormal lab result, we will notify you by phone as soon as possible.  Submit refill requests through Cubito or call your pharmacy and they will forward the refill request to us. Please allow 3 business days for your refill to be completed.          Additional Information About Your Visit        MyChart Information     Cubito lets you send messages to your doctor, view your test results, renew your prescriptions, schedule appointments and more. To sign up, go to [email or web address] . Click on \"Log in\" on the left side of the screen, which will take you to the Welcome page. Then click on \"Sign up Now\" on the right side of the page.     You will be asked to enter the access code listed below, as well as some personal information. " Please follow the directions to create your username and password.     Your access code is: [email or web address]  Expires: 10/24/2017 10:01 AM     Your access code will  in 90 days. If you need help or a new code, please call your [place] clinic or [phone].        Care EveryWhere ID     This is your Care EveryWhere ID. This could be used by other organizations to access your [place] medical records  [id number]         Blood Pressure from Last 3 Encounters:   17 (!) 146/100   17 (!) 151/102   17 (!) 132/94    Weight from Last 3 Encounters:   17 240 lb (108.9 kg)   17 240 lb (108.9 kg)   17 242 lb (109.8 kg)              We Performed the Following     AUDIOGRAM/TYMPANOGRAM - INTERFACE     COMPREHENSIVE HEARING TEST     TYMPANOMETRY          Today's Medication Changes          These changes are accurate as of: 17  4:41 PM.  If you have any questions, ask your nurse or doctor.               Start taking these medicines.        Dose/Directions    ofloxacin 0.3 % otic solution   Commonly known as:  FLOXIN   Used for:  Acute suppurative otitis media of left ear without spontaneous rupture of tympanic membrane, recurrence not specified   Started by:  Angel Palmer MD        Dose:  5 drop   Place 5 drops Into the left ear 2 times daily for 10 days   Quantity:  5 mL   Refills:  0       prednisoLONE sodium phosphate 1 % ophthalmic solution   Commonly known as:  INFLAMASE FORTE   Used for:  Acute suppurative otitis media of left ear without spontaneous rupture of tympanic membrane, recurrence not specified   Started by:  Angel Palmer MD        5 drops in left ear twice a day   Quantity:  5 mL   Refills:  1         Stop taking these medicines if you haven't already. Please contact your care team if you have questions.     cefdinir 300 MG capsule   Commonly known as:  OMNICEF   Stopped by:  Gerardo Layton PA-C                Where to get your medicines      These  medications [place] [facility] [place] - [address]  [address] [facility] [address]     Phone:  [phone]     ofloxacin 0.3 % otic solution    prednisoLONE sodium phosphate 1 % ophthalmic solution                Primary Care Provider    None Specified       No primary provider on file.        Equal Access to Services     HUYEN CHENG : Hadghanshyam tee hadasho Soomaali, waaxda luqadaha, qaybta kaalmada john, aleja roberto. So [facility] [phone].    ATENCIÓN: Si habla español, tiene a zuleta disposición servicios gratuitos de asistencia lingüística. [facility] [phone].    We comply with applicable federal civil rights laws and [place] laws. We do not discriminate on the basis of race, color, national origin, age, disability sex, sexual orientation or gender identity.            Thank you!     Thank you for choosing [facility]  for your care. Our goal is always to provide you with excellent care. Hearing back from our patients is one way we can continue to improve our services. Please take a few minutes to complete the written survey that you may receive in the mail after your visit with us. Thank you!             Your Updated Medication List - Protect others around you: Learn how to safely use, store and throw away your medicines at www.disposemymeds.org.          This list is accurate as of: 8/23/17  4:41 PM.  Always use your most recent med list.                   Brand Name Dispense Instructions for use Diagnosis    amoxicillin-clavulanate 875-125 MG per tablet    AUGMENTIN    20 tablet    Take 1 tablet by mouth 2 times daily    Acute mucoid otitis media of left ear       losartan 25 MG tablet    COZAAR    90 tablet    Take 1 tablet (25 mg) by mouth daily    Benign essential hypertension       ofloxacin 0.3 % otic solution    FLOXIN    5 mL    Place 5 drops Into the left ear 2 times daily for 10 days    Acute suppurative otitis  media of left ear without spontaneous rupture of tympanic membrane, recurrence not specified       prednisoLONE sodium phosphate 1 % ophthalmic solution    INFLAMASE FORTE    5 mL    5 drops in left ear twice a day    Acute suppurative otitis media of left ear without spontaneous rupture of tympanic membrane, recurrence not specified

## 2017-08-23 NOTE — NURSING NOTE
"Chief Complaint   Patient presents with     Consult     left ear pain       Initial Temp 98.2  F (36.8  C) (Temporal)  Ht 1.683 m (5' 6.25\")  Wt 108.9 kg (240 lb)  BMI 38.45 kg/m2 Estimated body mass index is 38.45 kg/(m^2) as calculated from the following:    Height as of this encounter: 1.683 m (5' 6.25\").    Weight as of this encounter: 108.9 kg (240 lb).  Medication Reconciliation: complete   Chepe/NOAH     "

## 2017-08-23 NOTE — TELEPHONE ENCOUNTER
Can you see if Dr. Palmer would be able to work her in today for an ear infection that is not responding to antibiotics? She leaves on a 30 hour plane ride next week and needs this taken care of before then. I can talk to Dr. Pastor if needed.    Gerardo Layton PA-C

## 2017-08-23 NOTE — TELEPHONE ENCOUNTER
Will route to JM. Was seen on 08/21. Referral placed, please sign if appropriate.  Zuly Cho, CMA

## 2017-08-23 NOTE — PROGRESS NOTES
ENT Consultation    Yumi Bhatti is a 36 year old female who is seen in consultation at the request of Krish Layton.      History of Present Illness - Yumi Bhatti is a 36 year old female with an ear infection.  She has a history of ear infections and has been hospitalized for ear infections in the past.  She has had a ear infection in both ears starting 2 weeks ago.  The right has resolved for the most part but the left is still plugged and painful.  She has been on 2 antibiotics for this ear infection.    Past Medical History -   Past Medical History:   Diagnosis Date     Depressive disorder, not elsewhere classified     post-partum     Essential hypertension with goal blood pressure less than 140/90 2016     Hypertension      Hypothyroidism, unspecified type 2016     Migraines      Other complication of obstetrical surgical wounds, postpartum condition or complication 06/28/10    D/C 07/02/10     Pelvic peritoneal adhesions, female (postoperative) (postinfection)      Previous  delivery, delivered, with or without mention of antepartum condition 06/17/10    D/C 06/20/10     Septicemia (H)        Current Medications -   Current Outpatient Prescriptions:      amoxicillin-clavulanate (AUGMENTIN) 875-125 MG per tablet, Take 1 tablet by mouth 2 times daily, Disp: 20 tablet, Rfl: 0     losartan (COZAAR) 25 MG tablet, Take 1 tablet (25 mg) by mouth daily, Disp: 90 tablet, Rfl: 1    Allergies -   Allergies   Allergen Reactions     Macrobid [Nitrofuran Derivatives] GI Disturbance     Latex Rash       Social History -   Social History     Social History     Marital status:      Spouse name: [facility]     Number of children: 3     Years of education: N/A     Occupational History     homemaker       Homemaker      Homemaker     Social History Main Topics     Smoking status: Never Smoker     Smokeless tobacco: Never Used      Comment: no smokers in the household     Alcohol use Yes       "Comment: none since pg     Drug use: No     Sexual activity: Yes     Partners: Male      Comment: no protection     Other Topics Concern      Service No     Blood Transfusions No     Caffeine Concern No     occasssionally     Occupational Exposure No     Hobby Hazards No     Sleep Concern No     Stress Concern No     Weight Concern No     Special Diet No     Back Care Yes     chronic     Exercise Yes     Advised exercising 30 minutes/day     Bike Helmet No     Seat Belt Yes     Self-Exams No     Social History Narrative    Lives in [place] with her  and 3 sons.    No smokers in the home.  No concerns about domestic violence.  No indoor cats/kittens.       Family History -   Family History   Problem Relation Age of Onset     DIABETES Mother      adult     Depression Mother      Hypertension Mother      CEREBROVASCULAR DISEASE Mother      has had three strokes in  age 30's.     Alcohol/Drug Mother      alcoholic     Psychotic Disorder Mother      Alcohol/Drug Father      alcoholic and drug abuse     Depression Maternal Grandmother      Alcohol/Drug Maternal Grandmother      DIABETES Maternal Grandfather      Depression Maternal Grandfather      Arthritis Maternal Grandfather      Alcohol/Drug Maternal Grandfather      Depression Brother      Alcohol/Drug Brother      has seven half  brothers and all have alcohol and drug abuse.     Alcohol/Drug Paternal Grandmother      Alcohol/Drug Paternal Grandfather      Psychotic Disorder Brother        Review of Systems - As per HPI and PMHx, otherwise review of system review of the head and neck negative.    Physical Exam  Temp 98.2  F (36.8  C) (Temporal)  Ht 1.683 m (5' 6.25\")  Wt 108.9 kg (240 lb)  BMI 38.45 kg/m2  BMI: Body mass index is 38.45 kg/(m^2).    General - The patient is well nourished and well developed, and appears to have good nutritional status.  Alert and oriented to person and place, answers questions and cooperates with examination " appropriately.    SKIN - No suspicious lesions or rashes.  Respiration - No respiratory distress.     Head and Face - Normocephalic and atraumatic, with no gross asymmetry noted of the contour of the facial features.  The facial nerve is intact, with strong symmetric movements.    Voice and Breathing - The patient was breathing comfortably without the use of accessory muscles. There was no wheezing, stridor, or stertor.  The patients voice was clear and strong, and had appropriate pitch and quality.    Ears - Bilateral pinna and EACs with normal appearing overlying skin. Tympanic membrane intact with good mobility on pneumatic otoscopy bilaterally. Bony landmarks of the ossicular chain are normal. The tympanic membranes are normal in appearance on the right. The left is very inflamed and draining from the middle ear.    Eyes - Extraocular movements intact.  Sclera were not icteric or injected, conjunctiva were pink and moist.    Mouth - Examination of the oral cavity showed pink, healthy oral mucosa. No lesions or ulcerations noted.  The tongue was mobile and midline, and the dentition were in good condition.      Throat - The walls of the oropharynx were smooth, pink, moist, symmetric, and had no lesions or ulcerations.  The tonsillar pillars and soft palate were symmetric.  The uvula was midline on elevation.    Neck - Normal midline excursion of the laryngotracheal complex during swallowing.  Full range of motion on passive movement.  Palpation of the occipital, submental, submandibular, internal jugular chain, and supraclavicular nodes did not demonstrate any abnormal lymph nodes or masses.  The carotid pulse was palpable bilaterally.  Palpation of the thyroid was soft and smooth, with no nodules or goiter appreciated.  The trachea was mobile and midline. Tenderness in the left SCM.    Nose - External contour is symmetric, no gross deflection or scars.  Nasal mucosa is pink and moist with no abnormal mucus.  The  septum was midline and non-obstructive, turbinates of normal size and position.  No polyps, masses, or purulence noted on examination.    Neuro - Nonfocal neuro exam is normal, CN 2 through 12 intact, normal gait and muscle tone.    Performed in clinic today:    Ears - On examination of the ears, I found that the left ear was impacted with cerumen.   On the left side once again using the magnified speculum to perform cerumen removal.  I began by using a cerumen loop to gently lift the edges of the cerumen mass away from the walls of the external canal.  Once I did this, I was able to use suction to pull/suction away fragments of wax and debris. I removed all the wax and debri. The tympanic membrane was intact, no sign of perforation or middle ear effusion.    Audiologic Studies - An audiogram and tympanogram were performed today as part of the evaluation and personally reviewed. The tympanogram shows Type A curves on the right and Type B curves on the left.  The audiogram showed Normal hearing on the right and mild conductive loss on the left.      Procedure - Left Myringotomy with aspiration    Procedure - After discussion of the risks and benefits of myringotomy, informed consent was signed and placed in the chart.  I began with the Left side.  I proceeded to position the patient in a semi-supine position in the examination chair.  Using the binocular surgical microscope, I then proceeded to clean the canal of cerumen and squamous debris.  I was able to see the tympanic membrane.  Using a small suction tip, I applied a tiny coating of phenol onto the tympanic membrane.  After visualizing a good tavon, I then proceeded to use a myringotomy knife to make a radially oriented incision in the tympanic membrane.  The middle ear appears full of granulation tissue and is very sensitive.        A/P - Yumi Bhatti is a 36 year old female Acute otitis media in the left ear.    The patient was instructed on water  precautions and given a prescription for otic antibiotic drops to use for three days.  I will see them in 1weeks for follow up sicne she is leaving the country.  The patient was instructed to call in or return sooner if there was any drainage from the ear.    Progress note was partially captured by Charisse Vega MA and reviewed/addended by Dr. Palmer for accuracy and content.      Angel Palmer MD

## 2017-08-23 NOTE — PROGRESS NOTES
AUDIOLOGY REPORT: HEARING TEST    SUBJECTIVE:  Yumi Bhatti was referred to audiology from ENT by Dr. Palmer for a hearing examination. Patient reports bilateral ear infections that began two weeks ago. Patient also reports aural fullness, decreased hearing, and pain in her left ear only.    OBJECTIVE:    Otoscopy:   Otoscopic exam indicates debris in the left ear, and clear right ear    Tympanometry:    RIGHT: normal eardrum mobility     LEFT:   restricted eardrum mobility (Type B)    Thresholds:   Pure Tone Thresholds assessed using conventional audiometry with good  reliability from 250-8000 Hz bilaterally using circumaural headphones    RIGHT:  normal hearing sensitivity for all frequencies tested   LEFT:    mild conductive hearing loss    Speech Reception Threshold:   RIGHT: 10 dB HL   LEFT:   20 dB HL    Word Recognition Score:    RIGHT: 100% at 50 dB HL using NU-6 recorded word list.   LEFT:   100% at 60 dB HL using NU-6 recorded word list.    ASSESSMENT:  Unilateral conductive hearing loss of the left ear    Discussed results with the patient.     PLAN:  Patient was returned to ENT for follow up.     Azeb Rodrigez.  Licensed Audiologist, [place] #[id number]  [facility]  8/23/2017

## 2017-08-23 NOTE — TELEPHONE ENCOUNTER
Reason for Call: Request for an order or referral:    Order or referral being requested: ear inf ENT    Date needed: as soon as possible    Has the patient been seen by the PCP for this problem? YES    Additional comments: is wondering if Krish could refer her to ENT or if he would work her in today for fu    Phone number Patient can be reached at:  [phone] until noon    Best Time:  any    Can we leave a detailed message on this number?  YES    Call taken on 8/23/2017 at 7:58 AM by Sharee Kelly

## 2017-08-23 NOTE — MR AVS SNAPSHOT
"              After Visit Summary   8/23/2017    Yumi Bhatti    MRN: [id number]           Patient Information     Date Of Birth          1980        Visit Information        Provider Department      8/23/2017 11:30 AM Angel Palmer MD [facility]        Today's Diagnoses     Acute suppurative otitis media of left ear without spontaneous rupture of tympanic membrane, recurrence not specified    -  1       Follow-ups after your visit        Your next 10 appointments already scheduled     Aug 30, 2017  1:15 PM CDT   Return Visit with Angel Palmer MD   [facility] ([facility])    [address]  [address]  [place] [address] [phone]              Who to contact     If you have questions or need follow up information about today's clinic visit or your schedule please contact [facility] directly at [phone].  Normal or non-critical lab and imaging results will be communicated to you by MyChart, letter or phone within 4 business days after the clinic has received the results. If you do not hear from us within 7 days, please contact the clinic through MyChart or phone. If you have a critical or abnormal lab result, we will notify you by phone as soon as possible.  Submit refill requests through S[employer]obuy or call your pharmacy and they will forward the refill request to us. Please allow 3 business days for your refill to be completed.          Additional Information About Your Visit        CareCloudhart Information     S[employer]obuy lets you send messages to your doctor, view your test results, renew your prescriptions, schedule appointments and more. To sign up, go to [email or web address] . Click on \"Log in\" on the left side of the screen, which will take you to the Welcome page. Then click on \"Sign up Now\" on the right side of the page.     You will be asked to enter the access code listed below, as well as some personal information. " "Please follow the directions to create your username and password.     Your access code is: [email or web address]  Expires: 10/24/2017 10:01 AM     Your access code will  in 90 days. If you need help or a new code, please call your [place] clinic or [phone].        Care EveryWhere ID     This is your Care EveryWhere ID. This could be used by other organizations to access your [place] medical records  [id number]        Your Vitals Were     Temperature Height BMI (Body Mass Index)             98.2  F (36.8  C) (Temporal) 1.683 m (5' 6.25\") 38.45 kg/m2          Blood Pressure from Last 3 Encounters:   17 (!) 146/100   17 (!) 151/102   17 (!) 132/94    Weight from Last 3 Encounters:   17 108.9 kg (240 lb)   17 108.9 kg (240 lb)   17 109.8 kg (242 lb)              We Performed the Following     Tympanotomy-Unilat          Today's Medication Changes          These changes are accurate as of: 17 12:40 PM.  If you have any questions, ask your nurse or doctor.               Start taking these medicines.        Dose/Directions    ofloxacin 0.3 % otic solution   Commonly known as:  FLOXIN   Used for:  Acute suppurative otitis media of left ear without spontaneous rupture of tympanic membrane, recurrence not specified   Started by:  Angel Palmer MD        Dose:  5 drop   Place 5 drops Into the left ear 2 times daily for 10 days   Quantity:  5 mL   Refills:  0       prednisoLONE sodium phosphate 1 % ophthalmic solution   Commonly known as:  INFLAMASE FORTE   Used for:  Acute suppurative otitis media of left ear without spontaneous rupture of tympanic membrane, recurrence not specified   Started by:  Angel Palmer MD        5 drops in left ear twice a day   Quantity:  5 mL   Refills:  1         Stop taking these medicines if you haven't already. Please contact your care team if you have questions.     cefdinir 300 MG capsule   Commonly known as:  OMNICEF   Stopped by:  " Gerardo Layton PA-C                Where to get your medicines      These [place] [facility] [place] [address]  [address], [place] [address]     Phone:  [phone]     ofloxacin 0.3 % otic solution    prednisoLONE sodium phosphate 1 % ophthalmic solution                Primary Care Provider    None Specified       No primary provider on file.        Equal Access to Services     [facility]: Hadii aad ku hadasho Soomaali, waaxda luqadaha, qaybta kaalmada adeegyada, waxay idiin hayscootern adekandice parkerblackyahir flores . So [facility] [phone].    ATENCIÓN: Si habla español, tiene a zuleta disposición servicios gratuitos de asistencia lingüística. [facility] [phone].    We comply with applicable federal civil rights laws and [place] laws. We do not discriminate on the basis of race, color, national origin, age, disability sex, sexual orientation or gender identity.            Thank you!     Thank you for choosing [facility]  for your care. Our goal is always to provide you with excellent care. Hearing back from our patients is one way we can continue to improve our services. Please take a few minutes to complete the written survey that you may receive in the mail after your visit with us. Thank you!             Your Updated Medication List - Protect others around you: Learn how to safely use, store and throw away your medicines at www.disposemymeds.org.          This list is accurate as of: 8/23/17 12:40 PM.  Always use your most recent med list.                   Brand Name Dispense Instructions for use Diagnosis    amoxicillin-clavulanate 875-125 MG per tablet    AUGMENTIN    20 tablet    Take 1 tablet by mouth 2 times daily    Acute mucoid otitis media of left ear       losartan 25 MG tablet    COZAAR    90 tablet    Take 1 tablet (25 mg) by mouth daily    Benign essential hypertension       ofloxacin 0.3 % otic solution    FLOXIN    5 mL    Place  5 drops Into the left ear 2 times daily for 10 days    Acute suppurative otitis media of left ear without spontaneous rupture of tympanic membrane, recurrence not specified       prednisoLONE sodium phosphate 1 % ophthalmic solution    INFLAMASE FORTE    5 mL    5 drops in left ear twice a day    Acute suppurative otitis media of left ear without spontaneous rupture of tympanic membrane, recurrence not specified

## 2017-08-25 ENCOUNTER — MYC MEDICAL ADVICE (OUTPATIENT)
Dept: OTOLARYNGOLOGY | Facility: OTHER | Age: 37
End: 2017-08-25

## 2017-08-25 ENCOUNTER — ALLIED HEALTH/NURSE VISIT (OUTPATIENT)
Dept: FAMILY MEDICINE | Facility: OTHER | Age: 37
End: 2017-08-25

## 2017-08-25 ENCOUNTER — TELEPHONE (OUTPATIENT)
Dept: OTOLARYNGOLOGY | Facility: OTHER | Age: 37
End: 2017-08-25

## 2017-08-25 VITALS — HEART RATE: 80 BPM | SYSTOLIC BLOOD PRESSURE: 130 MMHG | DIASTOLIC BLOOD PRESSURE: 89 MMHG

## 2017-08-25 DIAGNOSIS — Z01.30 BP CHECK: Primary | ICD-10-CM

## 2017-08-25 NOTE — NURSING NOTE
Prior to injection verified patient identity using patient's name and date of birth.  Yumi Bhatti is a 36 year old female who comes in today for a Blood Pressure check because of saw Krish Layton on08/21/17 and BP was still high so he told her to come in and get it checked.    *Document pulse and BP  *Use new set of vitals button for multiple readings.  *Use extended vitals for orthostatic    Vitals as recorded, a large cuff was used.    Patient is taking medication as prescribed  Patient is tolerating medications well.  Patient is not monitoring Blood Pressure at home.      Current complaints: headaches    Disposition: results routed to MD/AP

## 2017-08-25 NOTE — MR AVS SNAPSHOT
"              After Visit Summary   8/25/2017    Yumi Bhatti    MRN: [id number]           Patient Information     Date Of Birth          1980        Visit Information        Provider Department      8/25/2017 9:30 AM FLO ANN TEAM B, [facility]        Today's Diagnoses     BP check    -  1       Follow-ups after your visit        Your next 10 appointments already scheduled     Aug 30, 2017  1:15 PM CDT   Return Visit with Angel Palmer MD   [facility] ([facility]    [address]  [place] [address] [phone]              Who to contact     If you have questions or need follow up information about today's clinic visit or your schedule please contact [facility] directly at [phone].  Normal or non-critical lab and imaging results will be communicated to you by MyChart, letter or phone within 4 business days after the clinic has received the results. If you do not hear from us within 7 days, please contact the clinic through MyChart or phone. If you have a critical or abnormal lab result, we will notify you by phone as soon as possible.  Submit refill requests through Timesc[employer] or call your pharmacy and they will forward the refill request to us. Please allow 3 business days for your refill to be completed.          Additional Information About Your Visit        MyChart Information     Timescape lets you send messages to your doctor, view your test results, renew your prescriptions, schedule appointments and more. To sign up, go to [email or web address] . Click on \"Log in\" on the left side of the screen, which will take you to the Welcome page. Then click on \"Sign up Now\" on the right side of the page.     You will be asked to enter the access code listed below, as well as some personal information. Please follow the directions to create your username and password.     Your access code is: " [email or web address]  Expires: 10/24/2017 10:01 AM     Your access code will  in 90 days. If you need help or a new code, please call your [place] clinic or [phone].        Care EveryWhere ID     This is your Care EveryWhere ID. This could be used by other organizations to access your [place] medical records  [id number]        Your Vitals Were     Pulse                   80            Blood Pressure from Last 3 Encounters:   17 130/89   17 (!) 146/100   17 (!) 151/102    Weight from Last 3 Encounters:   17 240 lb (108.9 kg)   17 240 lb (108.9 kg)   17 242 lb (109.8 kg)              Today, you had the following     No orders found for display       Primary Care Provider    None Specified       No primary provider on file.        Equal Access to Services     [facility]: Hadii williams Kebede, wamiguel angel silva, bashirybgrabiel kaalmada john, aleja flores . So [facility] [phone].    ATENCIÓN: Si habla español, tiene a zuleta disposición servicios gratuitos de asistencia lingüística. Llame al [phone].    We comply with applicable federal civil rights laws and [place] laws. We do not discriminate on the basis of race, color, national origin, age, disability sex, sexual orientation or gender identity.            Thank you!     Thank you for choosing [facility]  for your care. Our goal is always to provide you with excellent care. Hearing back from our patients is one way we can continue to improve our services. Please take a few minutes to complete the written survey that you may receive in the mail after your visit with us. Thank you!             Your Updated Medication List - Protect others around you: Learn how to safely use, store and throw away your medicines at www.disposemymeds.org.          This list is accurate as of: 17  9:40 AM.  Always use your most recent med list.                   Brand Name Dispense  Instructions for use Diagnosis    amoxicillin-clavulanate 875-125 MG per tablet    AUGMENTIN    20 tablet    Take 1 tablet by mouth 2 times daily    Acute mucoid otitis media of left ear       losartan 25 MG tablet    COZAAR    90 tablet    Take 1 tablet (25 mg) by mouth daily    Benign essential hypertension       ofloxacin 0.3 % otic solution    FLOXIN    5 mL    Place 5 drops Into the left ear 2 times daily for 10 days    Acute suppurative otitis media of left ear without spontaneous rupture of tympanic membrane, recurrence not specified       prednisoLONE sodium phosphate 1 % ophthalmic solution    INFLAMASE FORTE    5 mL    5 drops in left ear twice a day    Acute suppurative otitis media of left ear without spontaneous rupture of tympanic membrane, recurrence not specified

## 2017-08-25 NOTE — Clinical Note
Pt said that if you need to reach her at all to reach her by her e-mail, her phone isn't on right now. Randy Fox, CMA

## 2017-08-25 NOTE — TELEPHONE ENCOUNTER
We are not able to send emails to pt. Pt has not signed a waiver for this. I have attempted to call pt's phone and unable to leave a message even.  If pt should call in: Per Dr. Palmer, pt can still expect to have a plugged ear. She should continue to use her ear drops until her appt on 8/30/17.SINGH Neal

## 2017-08-25 NOTE — PROGRESS NOTES
History of Present Illness - Yumi Bhatti is a 36 year old female presenting in clinic today for a recheck on Patient presents with:  RECHECK  Ear Problem        Present Symptoms include: hearing loss and plugged and they are - Was doing better but started feeling plugged again yesterday .  Yumi denies otolgia, otorhea and ear itching.    She had an injection in the ED and is starting to feel better again today.        Past Medical History -   Past Medical History:   Diagnosis Date     Depressive disorder, not elsewhere classified     post-partum     Essential hypertension with goal blood pressure less than 140/90 2016     Hypertension      Hypothyroidism, unspecified type 2016     Migraines      Other complication of obstetrical surgical wounds, postpartum condition or complication 06/28/10    D/C 07/02/10     Pelvic peritoneal adhesions, female (postoperative) (postinfection)      Previous  delivery, delivered, with or without mention of antepartum condition 06/17/10    D/C 06/20/10     Septicemia (H)        Current Medications -   Current Outpatient Prescriptions:      ofloxacin (FLOXIN) 0.3 % otic solution, Place 5 drops Into the left ear 2 times daily for 10 days, Disp: 5 mL, Rfl: 0     prednisoLONE sodium phosphate (INFLAMASE FORTE) 1 % ophthalmic solution, 5 drops in left ear twice a day, Disp: 5 mL, Rfl: 1     amoxicillin-clavulanate (AUGMENTIN) 875-125 MG per tablet, Take 1 tablet by mouth 2 times daily, Disp: 20 tablet, Rfl: 0     losartan (COZAAR) 25 MG tablet, Take 1 tablet (25 mg) by mouth daily, Disp: 90 tablet, Rfl: 1    Allergies -   Allergies   Allergen Reactions     Macrobid [Nitrofuran Derivatives] GI Disturbance     Latex Rash       Social History -   Social History     Social History     Marital status:      Spouse name: jonny     Number of children: 3     Years of education: N/A     Occupational History     homemaker       Homemaker      Homemaker     Social  "History Main Topics     Smoking status: Never Smoker     Smokeless tobacco: Never Used      Comment: no smokers in the household     Alcohol use Yes      Comment: none since pg     Drug use: No     Sexual activity: Yes     Partners: Male      Comment: no protection     Other Topics Concern      Service No     Blood Transfusions No     Caffeine Concern No     occasssionally     Occupational Exposure No     Hobby Hazards No     Sleep Concern No     Stress Concern No     Weight Concern No     Special Diet No     Back Care Yes     chronic     Exercise Yes     Advised exercising 30 minutes/day     Bike Helmet No     Seat Belt Yes     Self-Exams No     Social History Narrative    Lives in [place] with her  and 3 sons.    No smokers in the home.  No concerns about domestic violence.  No indoor cats/kittens.       Family History -   Family History   Problem Relation Age of Onset     DIABETES Mother      adult     Depression Mother      Hypertension Mother      CEREBROVASCULAR DISEASE Mother      has had three strokes in  age 30's.     Alcohol/Drug Mother      alcoholic     Psychotic Disorder Mother      Alcohol/Drug Father      alcoholic and drug abuse     Depression Maternal Grandmother      Alcohol/Drug Maternal Grandmother      DIABETES Maternal Grandfather      Depression Maternal Grandfather      Arthritis Maternal Grandfather      Alcohol/Drug Maternal Grandfather      Depression Brother      Alcohol/Drug Brother      has seven half  brothers and all have alcohol and drug abuse.     Alcohol/Drug Paternal Grandmother      Alcohol/Drug Paternal Grandfather      Psychotic Disorder Brother        Review of Systems - As per HPI and PMHx, otherwise review of system review of the head and neck negative.    Physical Exam  Pulse 85  Ht 1.702 m (5' 7\")  Wt 108.9 kg (240 lb)  LMP 07/31/2017  SpO2 98%  BMI 37.59 kg/m2  BMI: Body mass index is 37.59 kg/(m^2).    General - The patient is well nourished and " well developed, and appears to have good nutritional status.  Alert and oriented to person and place, answers questions and cooperates with examination appropriately.    SKIN - No suspicious lesions or rashes.  Respiration - No respiratory distress.     Head and Face - Normocephalic and atraumatic, with no gross asymmetry noted of the contour of the facial features.  The facial nerve is intact, with strong symmetric movements.    Voice and Breathing - The patient was breathing comfortably without the use of accessory muscles. There was no wheezing, stridor, or stertor.  The patients voice was clear and strong, and had appropriate pitch and quality.    Ears - Bilateral pinna and EACs with normal appearing overlying skin. Tympanic membrane intact with good mobility on pneumatic otoscopy bilaterally. Bony landmarks of the ossicular chain are normal. The tympanic membranes are normal in appearance. No retraction, perforation, or masses.  No fluid or purulence was seen in the external canal or the middle ear.     Eyes - Extraocular movements intact.  Sclera were not icteric or injected, conjunctiva were pink and moist.    Mouth - Examination of the oral cavity showed pink, healthy oral mucosa. No lesions or ulcerations noted.  The tongue was mobile and midline, and the dentition were in good condition.      Throat - The walls of the oropharynx were smooth, pink, moist, symmetric, and had no lesions or ulcerations.  The tonsillar pillars and soft palate were symmetric.  The uvula was midline on elevation.    Neck - Normal midline excursion of the laryngotracheal complex during swallowing.  Full range of motion on passive movement.  Palpation of the occipital, submental, submandibular, internal jugular chain, and supraclavicular nodes did not demonstrate any abnormal lymph nodes or masses.  The carotid pulse was palpable bilaterally.  Palpation of the thyroid was soft and smooth, with no nodules or goiter appreciated.  The  trachea was mobile and midline.    Nose - External contour is symmetric, no gross deflection or scars.  Nasal mucosa is pink and moist with no abnormal mucus.  The septum was midline and non-obstructive, turbinates of normal size and position.  No polyps, masses, or purulence noted on examination.    Neuro - Nonfocal neuro exam is normal, CN 2 through 12 intact, normal gait and muscle tone.      Performed in clinic today:  Audiologic Studies - A tympanogram was performed today as part of the evaluation and personally reviewed. The tympanogram shows normal Type A curves, with normal canal volumes and middle ear pressures.        A/P - Yumi Bhtati is a 36 year old female with a history of otitis media      Her ears are looking much better on exam today and her Tympanogram is normal today.  I will place an order for Prednisolone drops to reduce some TM inflammation  for another 5 days she should continue this and follow up as needed.      Yumi should follow up in as needed.      At Yumi next appointment they will not need a hearing test.    Progress note was partially captured by Charisse Vega MA and reviewed/addended by Dr. Palmer for accuracy and content.      Angel Palmer MD

## 2017-08-25 NOTE — TELEPHONE ENCOUNTER
Patients left ear is still plugged. How long before it unplugs after her procedure.  Patient is hard to get a hold of by phone. Are you able to email her?  [email or web address]

## 2017-08-26 ENCOUNTER — MYC MEDICAL ADVICE (OUTPATIENT)
Dept: FAMILY MEDICINE | Facility: OTHER | Age: 37
End: 2017-08-26

## 2017-08-26 ENCOUNTER — HOSPITAL ENCOUNTER (EMERGENCY)
Facility: CLINIC | Age: 37
Discharge: HOME | End: 2017-08-26
Attending: PHYSICIAN ASSISTANT | Admitting: PHYSICIAN ASSISTANT
Payer: MEDICAID

## 2017-08-26 VITALS
WEIGHT: 240 LBS | SYSTOLIC BLOOD PRESSURE: 120 MMHG | HEART RATE: 72 BPM | HEIGHT: 67 IN | RESPIRATION RATE: 16 BRPM | DIASTOLIC BLOOD PRESSURE: 78 MMHG | TEMPERATURE: 97.8 F | OXYGEN SATURATION: 99 % | BODY MASS INDEX: 37.67 KG/M2

## 2017-08-26 DIAGNOSIS — H66.002 LEFT ACUTE SUPPURATIVE OTITIS MEDIA: ICD-10-CM

## 2017-08-26 DIAGNOSIS — H69.93 DYSFUNCTION OF EUSTACHIAN TUBE, BILATERAL: ICD-10-CM

## 2017-08-26 PROCEDURE — 99284 EMERGENCY DEPT VISIT MOD MDM: CPT | Mod: Z6 | Performed by: PHYSICIAN ASSISTANT

## 2017-08-26 PROCEDURE — 99284 EMERGENCY DEPT VISIT MOD MDM: CPT | Mod: 25 | Performed by: PHYSICIAN ASSISTANT

## 2017-08-26 PROCEDURE — 96372 THER/PROPH/DIAG INJ SC/IM: CPT | Performed by: PHYSICIAN ASSISTANT

## 2017-08-26 PROCEDURE — 25000128 H RX IP 250 OP 636: Performed by: PHYSICIAN ASSISTANT

## 2017-08-26 RX ORDER — METHYLPREDNISOLONE 4 MG
TABLET, DOSE PACK ORAL
Qty: 21 TABLET | Refills: 0 | Status: SHIPPED | OUTPATIENT
Start: 2017-08-26 | End: 2018-09-24

## 2017-08-26 RX ORDER — CEFTRIAXONE SODIUM 1 G
1 VIAL (EA) INJECTION EVERY 24 HOURS
Status: DISCONTINUED | OUTPATIENT
Start: 2017-08-26 | End: 2017-08-26 | Stop reason: HOSPADM

## 2017-08-26 RX ADMIN — CEFTRIAXONE SODIUM 1 G: 1 INJECTION, POWDER, FOR SOLUTION INTRAMUSCULAR; INTRAVENOUS at 15:30

## 2017-08-26 NOTE — ED AVS SNAPSHOT
[facility] Emergency Department    [address]     [place] [address]    Phone:  [phone]    Fax:  [phone]                                       Yumi Bhatti   MRN: [id number]    Department:  [facility] Emergency Department   Date of Visit:  8/26/2017           Patient Information     Date Of Birth          1980        Your diagnoses for this visit were:     Dysfunction of eustachian tube, bilateral     Left acute suppurative otitis media        You were seen by Andrae Urban PA-C.      Follow-up Information     Follow up with [facility]. Schedule an appointment as soon as possible for a visit in 2 days.    Specialties:  Sports Medicine, Allergy, Family Medicine, Internal Medicine, Pediatrics, Obstetrics & Gynecology    Why:  For ear recheck    Contact information:    [address]  [address]  [facility] [address] [phone]        Discharge Instructions       1.  Please keep taking the Augmentin ( antibiotic ) for the infection.  2.  Please start taking plain Mucinex 600 mg 2 times per day to help as a decongestant.  Do not take Sudafed, as this can increase your blood pressure.    3.  Please keep using the Ofloxacin ear drops in the left ear.    4.  Please take the Medrol Dosepak ( steroid ) to decrease the pressure in the eustachian tubes.    5.  Please make sure that you are drinking at least 8-10 glasses of water daily to maintain adequate hydration.         Future Appointments        Provider Department Dept Phone Center    8/30/2017 1:15 PM Angel Palmer MD, MD [facility] [phone] [facility]      24 Hour Appointment Hotline       To make an appointment at any [facility], call [phone]). If you don't have a family doctor or clinic, we will help you find one. [facility] are conveniently located to serve the needs of you and your family.             Review of your  medicines      START taking        Dose / Directions Last dose taken    methylPREDNISolone 4 MG tablet   Commonly known as:  MEDROL DOSEPAK   Quantity:  21 tablet        Follow package directions.   Refills:  0          Our records show that you are taking the medicines listed below. If these are incorrect, please call your family doctor or clinic.        Dose / Directions Last dose taken    ALEVE PO   Dose:  550 mg        Take 550 mg by mouth 2 times daily as needed for moderate pain   Refills:  0        amoxicillin-clavulanate 875-125 MG per tablet   Commonly known as:  AUGMENTIN   Dose:  1 tablet   Quantity:  20 tablet        Take 1 tablet by mouth 2 times daily   Refills:  0        losartan 25 MG tablet   Commonly known as:  COZAAR   Dose:  25 mg   Quantity:  90 tablet        Take 1 tablet (25 mg) by mouth daily   Refills:  1        MOTRIN IB PO   Dose:  600 mg        Take 600 mg by mouth every 6 hours as needed for moderate pain   Refills:  0        ofloxacin 0.3 % otic solution   Commonly known as:  FLOXIN   Dose:  5 drop   Quantity:  5 mL        Place 5 drops Into the left ear 2 times daily for 10 days   Refills:  0        prednisoLONE sodium phosphate 1 % ophthalmic solution   Commonly known as:  INFLAMASE FORTE   Quantity:  5 mL        5 drops in left ear twice a day   Refills:  1                Prescriptions were sent or printed at these locations (1 Prescription)                   [facility] [place], [place] - [address]    [address] , [facility] [address]    Telephone:  [phone]   Fax:  [phone]   Hours:                  E-Prescribed (1 of 1)         methylPREDNISolone (MEDROL DOSEPAK) 4 MG tablet                Orders Needing Specimen Collection     None      Pending Results     No orders found from 8/24/2017 to 8/27/2017.            Pending Culture Results     No orders found from 8/24/2017 to 8/27/2017.            Pending Results Instructions     If you had any lab  results that were not finalized at the time of your Discharge, you can call the ED Lab Result RN at [phone]. You will be contacted by this team for any positive Lab results or changes in treatment. The nurses are available 7 days a week from 10A to 6:30P.  You can leave a message 24 hours per day and they will return your call.        Thank you for choosing [facility]       Thank you for choosing [facility] for your care. Our goal is always to provide you with excellent care. Hearing back from our patients is one way we can continue to improve our services. Please take a few minutes to complete the written survey that you may receive in the mail after you visit with us. Thank you!        [facility] Information     [employer] gives you secure access to your electronic health record. If you see a primary care provider, you can also send messages to your care team and make appointments. If you have questions, please call your primary care clinic.  If you do not have a primary care provider, please call [phone] and they will assist you.        Care EveryWhere ID     This is your Care EveryWhere ID. This could be used by other organizations to access your [facility] medical records  [id number]        Equal Access to Services     HUYEN CHENG : Omari Kebede, jodi silva, aleja crooks . [facility] [phone].    ATENCIÓN: Si habla español, tiene a zuleta disposición servicios gratuitos de asistencia lingüística. Alessandra al [phone].    We comply with applicable federal civil rights laws and [place] laws. We do not discriminate on the basis of race, color, national origin, age, disability sex, sexual orientation or gender identity.            After Visit Summary       This is your record. Keep this with you and show to your community pharmacist(s) and doctor(s) at your next visit.

## 2017-08-26 NOTE — ED PROVIDER NOTES
History     Chief Complaint   Patient presents with     Otalgia     HPI  Yumi Bhatti is a 36 year old female who presents for evaluation of ear pain.    Was seen at [facility] on 8/14/17 for the evaluation of ear pain and was diagnosed with AOM.  Treated with Cefdinir.  Was then seen by her PCP in the [facility] on 8/21/17 and switched to Augmentin due to ongoing symptoms.  Was then seen by ENT on 8/23/17 for evaluation and underwent office based left TM Myringotomy due to persistent ASOM.    She is very concerned due to having to return home to [facility] by plane in 5 days.  She continues to have bilateral ear pressure and a plugged feeling. She has discomfort as well, but not as bad as before. She states that her ENT told her to come in for IV antibiotics if the symptoms persisted despite the last treatment that was performed. Her ENT is seeing her for recheck appointment in 4 days.  She denies any fevers or chills. Denies any other URI symptoms. No rhinorrhea, congestion, pharyngitis, or cough. Pain is rated 4-5 on a scale of 10. She has used ibuprofen as needed.  She does report taking her medications as prescribed.        I have reviewed the Medications, Allergies, Past Medical and Surgical History, and Social History in the Epic system.    Allergies:   Allergies   Allergen Reactions     Macrobid [Nitrofuran Derivatives] GI Disturbance     Latex Rash         No current facility-administered medications on file prior to encounter.   Current Outpatient Prescriptions on File Prior to Encounter:  ofloxacin (FLOXIN) 0.3 % otic solution Place 5 drops Into the left ear 2 times daily for 10 days   prednisoLONE sodium phosphate (INFLAMASE FORTE) 1 % ophthalmic solution 5 drops in left ear twice a day   amoxicillin-clavulanate (AUGMENTIN) 875-125 MG per tablet Take 1 tablet by mouth 2 times daily   losartan (COZAAR) 25 MG tablet Take 1 tablet (25 mg) by mouth daily       Patient Active Problem List  "  Diagnosis     Obesity     Sacroiliitis, not elsewhere classified (H)     Hemorrhoids     Insomnia     CARDIOVASCULAR SCREENING; LDL GOAL LESS THAN 160     Health Care Home     [facility] recommends twice weekly BPP's with delivery at 37 weeks=13 with TL     Bulge of lumbar disc without myelopathy     CKD (chronic kidney disease) stage 2, GFR 60-89 ml/min     Hypertension goal BP (blood pressure) < 140/90     Migraine     Coccydynia     Hemorrhoids in pregnancy with baby delivered     Major depression in complete remission (H)     Dysuria     Essential hypertension with goal blood pressure less than 140/90     Hypothyroidism, unspecified type       Past Surgical History:   Procedure Laterality Date     C/SECTION, LOW TRANSVERSE  2006     C/SECTION, LOW TRANSVERSE  2007    , Low Transverse     C/SECTION, LOW TRANSVERSE  06/17/10    , Low Transverse     C/SECTION, LOW TRANSVERSE  14    ERCS with TL     HC TOOTH EXTRACTION W/FORCEP      age 22.     LAPAROSCOPIC SALPINGECTOMY  3/1/2012    Procedure:LAPAROSCOPIC SALPINGECTOMY; Laparoscopic Left Salpingectomy; Surgeon:LUIS GARCIA; Location:[facility] OR     TUBAL LIGATION  14     TUBAL/ECTOPIC PREGNANCY         Social History   Substance Use Topics     Smoking status: Never Smoker     Smokeless tobacco: Never Used      Comment: no smokers in the household     Alcohol use Yes      Comment: once or twice a month       Most Recent Immunizations   Administered Date(s) Administered     HepA-Ped 2 dose 09/10/2014     TD (ADULT, 7+) 1998     TDAP Vaccine (Adacel) 2013     Typhoid IM 09/10/2014       BMI: Estimated body mass index is 37.59 kg/(m^2) as calculated from the following:    Height as of this encounter: 1.702 m (5' 7\").    Weight as of this encounter: 108.9 kg (240 lb).      Review of Systems   All other systems reviewed and are negative.      Physical Exam   BP: (!) 141/99  Pulse: 77  Temp: 97.8  F (36.6  C)  Resp: " "16  Height: 170.2 cm (5' 7\")  Weight: 108.9 kg (240 lb)  SpO2: 98 %  Physical Exam  Generally healthy appearing female in NAD who is active and non-toxic appearing.   Head: Normocephalic, atraumatic, nontender to palpation  Eyes: PERRLA, conjunctiva and sclera clear  Ears: Left canal with white debris up against the TM. The TM does not appear to be bulging. There is no erythema. I cannot see a middle ear effusion. Right TM with retraction of the TM but no middle ear effusion. No erythema. Canal is nonedematous and does not have any debris in it. No auricle or tragus manipulation discomfort with the bilateral ears.   Nose: Nares normal and patent bilaterally.  Mucous membranes are non-erythematous and non-edematous.  No sinus tenderness.  Throat: Mucous membranes are clear.  No tonsilar hypertrophy, exudate, or erythema.  Neck: Supple.  FROM without pain.  No adenopathy.  No thyromegaly.   Lungs:  CTA bilaterally without wheezes, rales, or rhonchi.  Good breath sounds heard throughout all lung fields.  Tympanitic to percussion with no areas of dullness.     ED Course     ED Course     Procedures             Critical Care time:  none               Labs Ordered and Resulted from Time of ED Arrival Up to the Time of Departure from the ED - No data to display    Assessments & Plan (with Medical Decision Making)     Dysfunction of eustachian tube, bilateral  Left acute suppurative otitis media     36 year old female presents for evaluation of persistent otalgia despite multiple antibiotic trials and office myringotomy performed by ENT.  She is very concerned, as she is returning home to [facility] in 5 days, which is a 30 hour flight. On exam she is afebrile and has normal vital signs. She has evidence for eustachian tube dysfunction with TM retraction on the right. She likely still has an open TM on the left given the debris that is present up To the TM. She is very concerned about active infection. Due to this, I am going " to give her a one-time dose of IM Rocephin. She has hypertension treated with medication, therefore she is not a candidate for Sudafed. Therefore, we are going to place her on Mucinex. We discussed increasing her clear fluids. To try and treat any acute inflammation, we also are going to place her on a Medrol Dosepak. Possible side effects of medication discussed with her. She already has an appointment with her ENT in 4 days. If symptoms not improving, she could see her PCP in 2 days, which is Monday. The patient was in agreement with this plan and was suitable for discharge.      I have reviewed the nursing notes.    I have reviewed the findings, diagnosis, plan and need for follow up with the patient.       Discharge Medication List as of 8/26/2017  3:31 PM      START taking these medications    Details   methylPREDNISolone (MEDROL DOSEPAK) 4 MG tablet Follow package directions., Disp-21 tablet, R-0, E-Prescribe             Final diagnoses:   Dysfunction of eustachian tube, bilateral   Left acute suppurative otitis media       Disclaimer: This note consists of symbols derived from keyboarding, dictation and/or voice recognition software. As a result, there may be errors in the script that have gone undetected. Please consider this when interpreting information found in this chart.        8/26/2017   Andrae Urban PA-C   [facility] EMERGENCY DEPARTMENT     Andrae Urban PA-C  08/27/17 0000

## 2017-08-26 NOTE — ED AVS SNAPSHOT
[facility] Emergency Department    [address] [place] [address]    Phone:  [phone]    Fax:  [phone]                                       Yumi Bhatti   MRN: [id number]    Department:  [facility] Emergency Department   Date of Visit:  8/26/2017           After Visit Summary Signature Page     I have received my discharge instructions, and my questions have been answered. I have discussed any challenges I see with this plan with the nurse or doctor.    ..........................................................................................................................................  Patient/Patient Representative Signature      ..........................................................................................................................................  Patient Representative Print Name and Relationship to Patient    ..................................................               ................................................  Date                                            Time    ..........................................................................................................................................  Reviewed by Signature/Title    ...................................................              ..............................................  Date                                                            Time

## 2017-08-26 NOTE — ED NOTES
States was diagnosed alan ear infection two weeks ago. Put on an antibiotic which did not work. Saw Dr Layton on Monday and started on another antibiotic. Saw ENT and audiologist on Wed. ENT ruptured pt eardrum ( left ). Pt cont on augmentin and ear drops. Right ear was good on Wed. Now it is painful and left ear cont to give problems. Pt lives in [facility] and was going back by air on Thursday. ENT concerned about this as care is not as good there

## 2017-08-26 NOTE — DISCHARGE INSTRUCTIONS
1.  Please keep taking the Augmentin ( antibiotic ) for the infection.  2.  Please start taking plain Mucinex 600 mg 2 times per day to help as a decongestant.  Do not take Sudafed, as this can increase your blood pressure.    3.  Please keep using the Ofloxacin ear drops in the left ear.    4.  Please take the Medrol Dosepak ( steroid ) to decrease the pressure in the eustachian tubes.    5.  Please make sure that you are drinking at least 8-10 glasses of water daily to maintain adequate hydration.        Pt sister Lucretia Bagley given update on pt status and going to call pt son and give him an update. Pt sister requested pt transfer to [address] [place] [address] under Dr. Edwardo weeks.  Dr. Al Pablo update on pt sister request.      Leyla Patiño RN  07/14/17 [id number]

## 2017-08-28 NOTE — TELEPHONE ENCOUNTER
Per Dr. Palmer, pt can just keep her appt for the 30th. She should be using her ear drops. SINGH Neal

## 2017-08-30 ENCOUNTER — OFFICE VISIT (OUTPATIENT)
Dept: AUDIOLOGY | Facility: OTHER | Age: 37
End: 2017-08-30
Payer: MEDICAID

## 2017-08-30 ENCOUNTER — OFFICE VISIT (OUTPATIENT)
Dept: OTOLARYNGOLOGY | Facility: OTHER | Age: 37
End: 2017-08-30
Payer: MEDICAID

## 2017-08-30 VITALS — HEART RATE: 85 BPM | OXYGEN SATURATION: 98 % | WEIGHT: 240 LBS | BODY MASS INDEX: 37.67 KG/M2 | HEIGHT: 67 IN

## 2017-08-30 DIAGNOSIS — Z01.10 ENCOUNTER FOR EXAMINATION OF EARS WITHOUT ABNORMAL FINDINGS: Primary | ICD-10-CM

## 2017-08-30 DIAGNOSIS — H66.002 ACUTE SUPPURATIVE OTITIS MEDIA OF LEFT EAR WITHOUT SPONTANEOUS RUPTURE OF TYMPANIC MEMBRANE, RECURRENCE NOT SPECIFIED: Primary | ICD-10-CM

## 2017-08-30 PROCEDURE — 99213 OFFICE O/P EST LOW 20 MIN: CPT | Mod: 25 | Performed by: OTOLARYNGOLOGY

## 2017-08-30 PROCEDURE — 99207 ZZC NO CHARGE LOS: CPT | Performed by: AUDIOLOGIST

## 2017-08-30 PROCEDURE — 92567 TYMPANOMETRY: CPT | Performed by: AUDIOLOGIST

## 2017-08-30 RX ORDER — PREDNISOLONE SODIUM PHOSPHATE 10 MG/ML
SOLUTION/ DROPS OPHTHALMIC
Qty: 5 ML | Refills: 1 | Status: SHIPPED | OUTPATIENT
Start: 2017-08-30 | End: 2018-09-24

## 2017-08-30 NOTE — PROGRESS NOTES
AUDIOLOGY REPORT: TYMPANOMETRY    SUBJECTIVE:  Yumi Bhatti is a 36 year old female referred to audiology from ENT by Dr. Palmer for tympanometry. Patient reports improvement in hearing and reduced aural fullness in her left ear since visit on 8/23/2017.    OBJECTIVE:    Otoscopy:   RIGHT: clear ear canal   LEFT:  clear ear canal    Tympanometry:   RIGHT:  normal eardrum mobility   LEFT:   normal eardrum mobility    ASSESSMENT:  Normal eardrum mobility, bilaterally    Discussed results with the patient.     PLAN:  Patient was returned to ENT for follow up.     Azeb Rodrigez.  Licensed Audiologist, [place] #[id number]  [facility]  8/30/2017

## 2017-08-30 NOTE — MR AVS SNAPSHOT
After Visit Summary   8/30/2017    Yumi Bhatti    MRN: [id number]           Patient Information     Date Of Birth          1980        Visit Information        Provider Department      8/30/2017 1:15 PM Angel Palmer MD [facility]        Today's Diagnoses     Acute suppurative otitis media of left ear without spontaneous rupture of tympanic membrane, recurrence not specified    -  1       Follow-ups after your visit        Additional Services     AUDIOLOGY ADULT REFERRAL           AUDIOLOGY ADULT REFERRAL       Your provider has referred you to: [facility]: [facility] [phone]   [email or web address]    Specialty Testing:  Audiogram w/Tymps and Reflexes (Comprehensive Audiology Evaluation)                  Who to contact     If you have questions or need follow up information about today's clinic visit or your schedule please contact [facility] directly at [phone].  Normal or non-critical lab and imaging results will be communicated to you by SunEdisonhart, letter or phone within 4 business days after the clinic has received the results. If you do not hear from us within 7 days, please contact the clinic through SunEdisonhart or phone. If you have a critical or abnormal lab result, we will notify you by phone as soon as possible.  Submit refill requests through W4 or call your pharmacy and they will forward the refill request to us. Please allow 3 business days for your refill to be completed.          Additional Information About Your Visit        MyChart Information     W4 gives you secure access to your electronic health record. If you see a primary care provider, you can also send messages to your care team and make appointments. If you have questions, please call your primary care clinic.  If you do not have a primary care provider, please call [phone] and they will assist you.        Care  "EveryWhere ID     This is your Care EveryWhere ID. This could be used by other organizations to access your [facility] medical records  [id number]        Your Vitals Were     Pulse Height Last Period Pulse Oximetry BMI (Body Mass Index)       85 1.702 m (5' 7\") 07/31/2017 98% 37.59 kg/m2        Blood Pressure from Last 3 Encounters:   08/26/17 120/78   08/25/17 130/89   08/21/17 (!) 146/100    Weight from Last 3 Encounters:   08/30/17 108.9 kg (240 lb)   08/26/17 108.9 kg (240 lb)   08/23/17 108.9 kg (240 lb)              We Performed the Following     AUDIOLOGY ADULT REFERRAL     AUDIOLOGY ADULT REFERRAL          Where to get your medicines      These medications were sent to [facility] [place] - [address]  [address] [facility], [facility] [address]     Phone:  [phone]     prednisoLONE sodium phosphate 1 % ophthalmic solution          Primary Care Provider    None       No address on file        Equal Access to Services     SE[employer]MINI AH: Hadii williams tee hadasho Soomaali, waaxda luqadaha, qaybta kaalmada adeegyada, waxay kaylah flores . So [facility] [phone].    ATENCIÓN: Si habla español, tiene a zuleta disposición servicios gratuitos de asistencia lingüística. [facility] [phone].    We comply with applicable federal civil rights laws and [place] laws. We do not discriminate on the basis of race, color, national origin, age, disability sex, sexual orientation or gender identity.            Thank you!     Thank you for choosing [facility]  for your care. Our goal is always to provide you with excellent care. Hearing back from our patients is one way we can continue to improve our services. Please take a few minutes to complete the written survey that you may receive in the mail after your visit with us. Thank you!             Your Updated Medication List - Protect others around you: Learn how to safely use, store and throw away your medicines at " www.disposemymeds.org.          This list is accurate as of: 8/30/17  5:12 PM.  Always use your most recent med list.                   Brand Name Dispense Instructions for use Diagnosis    ALEVE PO      Take 550 mg by mouth 2 times daily as needed for moderate pain        amoxicillin-clavulanate 875-125 MG per tablet    AUGMENTIN    20 tablet    Take 1 tablet by mouth 2 times daily    Acute mucoid otitis media of left ear       losartan 25 MG tablet    COZAAR    90 tablet    Take 1 tablet (25 mg) by mouth daily    Benign essential hypertension       methylPREDNISolone 4 MG tablet    MEDROL DOSEPAK    21 tablet    Follow package directions.        MOTRIN IB PO      Take 600 mg by mouth every 6 hours as needed for moderate pain        ofloxacin 0.3 % otic solution    FLOXIN    5 mL    Place 5 drops Into the left ear 2 times daily for 10 days    Acute suppurative otitis media of left ear without spontaneous rupture of tympanic membrane, recurrence not specified       prednisoLONE sodium phosphate 1 % ophthalmic solution    INFLAMASE FORTE    5 mL    5 drops in left ear twice a day    Acute suppurative otitis media of left ear without spontaneous rupture of tympanic membrane, recurrence not specified

## 2017-08-30 NOTE — NURSING NOTE
"Chief Complaint   Patient presents with     RECHECK     Ear Problem     Recheck ear infection.        Initial Pulse 85  Ht 1.702 m (5' 7\")  Wt 108.9 kg (240 lb)  LMP 07/31/2017  SpO2 98%  BMI 37.59 kg/m2 Estimated body mass index is 37.59 kg/(m^2) as calculated from the following:    Height as of this encounter: 1.702 m (5' 7\").    Weight as of this encounter: 108.9 kg (240 lb).  Medication Reconciliation: complete  "

## 2017-08-30 NOTE — MR AVS SNAPSHOT
After Visit Summary   8/30/2017    Yumi Bhatti    MRN: [id number]           Patient Information     Date Of Birth          1980        Visit Information        Provider Department      8/30/2017 1:15 PM Trip Manzo AuD [facility]        Today's Diagnoses     Encounter for examination of ears without abnormal findings    -  1       Follow-ups after your visit        Who to contact     If you have questions or need follow up information about today's clinic visit or your schedule please contact [facility] directly at [phone].  Normal or non-critical lab and imaging results will be communicated to you by Dynhart, letter or phone within 4 business days after the clinic has received the results. If you do not hear from us within 7 days, please contact the clinic through MobileMDt or phone. If you have a critical or abnormal lab result, we will notify you by phone as soon as possible.  Submit refill requests through Greenplum Software or call your pharmacy and they will forward the refill request to us. Please allow 3 business days for your refill to be completed.          Additional Information About Your Visit        MyChart Information     Green[employer] Software gives you secure access to your electronic health record. If you see a primary care provider, you can also send messages to your care team and make appointments. If you have questions, please call your primary care clinic.  If you do not have a primary care provider, please call [phone] and they will assist you.        Care EveryWhere ID     This is your Care EveryWhere ID. This could be used by other organizations to access your [facility] medical records  [id number]        Your Vitals Were     Last Period                   07/31/2017            Blood Pressure from Last 3 Encounters:   08/26/17 120/78   08/25/17 130/89   08/21/17 (!) 146/100    Weight from Last 3 Encounters:   08/30/17 240 lb (108.9 kg)   08/26/17  240 lb (108.9 kg)   08/23/17 240 lb (108.9 kg)              We Performed the Following     AUDIOGRAM/TYMPANOGRAM - INTERFACE     TYMPANOMETRY          Where to get your medicines      These medications [place] [facility] [place] - [address] [facility]  [address] [facility], [facility] [address]     Phone:  [phone]     prednisoLONE sodium phosphate 1 % ophthalmic solution          Primary Care Provider    None       No address on file        Equal Access to Services     HUYEN CHENG : Hadii williams ku hadasho Soomaali, waaxda luqadaha, qaybta kaalmada adeegyada, waxay tracyin hayscootern keaton parkerblackyahir flores . So [facility] [phone].    ATENCIÓN: Si habla espgenaro, tiene a zuleta disposición servicios gratuitos de asistencia lingüística. Alessandra al [phone].    We comply with applicable federal civil rights laws and [place] laws. We do not discriminate on the basis of race, color, national origin, age, disability sex, sexual orientation or gender identity.            Thank you!     Thank you for choosing [facility]  for your care. Our goal is always to provide you with excellent care. Hearing back from our patients is one way we can continue to improve our services. Please take a few minutes to complete the written survey that you may receive in the mail after your visit with us. Thank you!             Your Updated Medication List - Protect others around you: Learn how to safely use, store and throw away your medicines at www.disposemymeds.org.          This list is accurate as of: 8/30/17  2:51 PM.  Always use your most recent med list.                   Brand Name Dispense Instructions for use Diagnosis    ALEVE PO      Take 550 mg by mouth 2 times daily as needed for moderate pain        amoxicillin-clavulanate 875-125 MG per tablet    AUGMENTIN    20 tablet    Take 1 tablet by mouth 2 times daily    Acute mucoid otitis media of left ear       losartan 25 MG tablet    COZAAR    90 tablet     Take 1 tablet (25 mg) by mouth daily    Benign essential hypertension       methylPREDNISolone 4 MG tablet    MEDROL DOSEPAK    21 tablet    Follow package directions.        MOTRIN IB PO      Take 600 mg by mouth every 6 hours as needed for moderate pain        ofloxacin 0.3 % otic solution    FLOXIN    5 mL    Place 5 drops Into the left ear 2 times daily for 10 days    Acute suppurative otitis media of left ear without spontaneous rupture of tympanic membrane, recurrence not specified       prednisoLONE sodium phosphate 1 % ophthalmic solution    INFLAMASE FORTE    5 mL    5 drops in left ear twice a day    Acute suppurative otitis media of left ear without spontaneous rupture of tympanic membrane, recurrence not specified

## 2018-09-13 ENCOUNTER — TELEPHONE (OUTPATIENT)
Dept: FAMILY MEDICINE | Facility: OTHER | Age: 38
End: 2018-09-13

## 2018-09-13 NOTE — TELEPHONE ENCOUNTER
Reason for call:  Patient reporting a symptom    Symptom or request: dizziness    Duration (how long have symptoms been present): off and on for a couple of months    Have you been treated for this before? No    Additional comments: patient called to schedule stated ok to lm for triage. Was not dizzy now.    Phone Number patient can be reached at:  Home number on file [phone] (home)    Best Time:  any    Can we leave a detailed message on this number:  YES    Call taken on 9/13/2018 at 3:06 PM by Sharee Kelly

## 2018-09-13 NOTE — TELEPHONE ENCOUNTER
"RN attempted patient. Phone stated, \"The caller is unable to receive calls at this time.\" Will attempt at a later time. Padmini Valverde RN, BSN   "

## 2018-09-14 ENCOUNTER — OFFICE VISIT (OUTPATIENT)
Dept: OBGYN | Facility: CLINIC | Age: 38
End: 2018-09-14
Payer: COMMERCIAL

## 2018-09-14 VITALS
HEIGHT: 66 IN | DIASTOLIC BLOOD PRESSURE: 100 MMHG | SYSTOLIC BLOOD PRESSURE: 142 MMHG | HEART RATE: 72 BPM | BODY MASS INDEX: 39.05 KG/M2 | WEIGHT: 243 LBS

## 2018-09-14 DIAGNOSIS — Z00.00 ROUTINE GENERAL MEDICAL EXAMINATION AT A HEALTH CARE FACILITY: Primary | ICD-10-CM

## 2018-09-14 PROCEDURE — 99395 PREV VISIT EST AGE 18-39: CPT | Performed by: OBSTETRICS & GYNECOLOGY

## 2018-09-14 NOTE — PATIENT INSTRUCTIONS
PREVENTIVE HEALTH RECOMMENDATIONS:   Get a physical every year.  A pap test is important to have done starting at age 21 and then every three years as long as your pap is normal.  When you receive the results of your pap test it will include when you need to have your next pap test.    You should be tested each year for chlamydia and gonorrhea if you are aged 16-25 and if you have had a new sexual partner since you were last tested.   Vaccines: Get a flu shot each year.   Eat at least 8-10 servings of fruits and vegetables daily.  Eat whole-grain bread and cereal, whole-wheat pasta and brown rice instead of white grains and white rice.   For bone health: Eat calcium-rich foods (dairy products) or take calcium pills (500 to 600 mg with vitamin D) twice a day with food.   Exercise for an average of 30 minutes a day, 5 days of the week. It can be as simple as taking a brisk walk.  This will help you control your weight and prevent many diseases.   Limit alcohol to one drink per day.   Don't smoke and limit your exposure to second hand smoke.  If you smoke consider making a plan to quit. Go to [employer] and clink on   [address] [employer]  for help   Wear sunscreen with at least SPF15 to prevent skin damage and skin cancer.   Brush your teeth twice a day and floss once a day and see your dentist twice a year for an exam and cleaning.   Have a great year and I will look forward to seeing you next year.   Lottie Ace, DO

## 2018-09-14 NOTE — MR AVS SNAPSHOT
After Visit Summary   9/14/2018    Yumi Bhatti    MRN: [id number]           Patient Information     Date Of Birth          1980        Visit Information        Provider Department      9/14/2018 10:15 AM Lottie Ace, DO [facility]        Today's Diagnoses     Routine general medical examination at a health care facility    -  1      Care Instructions    PREVENTIVE HEALTH RECOMMENDATIONS:   Get a physical every year.  A pap test is important to have done starting at age 21 and then every three years as long as your pap is normal.  When you receive the results of your pap test it will include when you need to have your next pap test.    You should be tested each year for chlamydia and gonorrhea if you are aged 16-25 and if you have had a new sexual partner since you were last tested.   Vaccines: Get a flu shot each year.   Eat at least 8-10 servings of fruits and vegetables daily.  Eat whole-grain bread and cereal, whole-wheat pasta and brown rice instead of white grains and white rice.   For bone health: Eat calcium-rich foods (dairy products) or take calcium pills (500 to 600 mg with vitamin D) twice a day with food.   Exercise for an average of 30 minutes a day, 5 days of the week. It can be as simple as taking a brisk walk.  This will help you control your weight and prevent many diseases.   Limit alcohol to one drink per day.   Don't smoke and limit your exposure to second hand smoke.  If you smoke consider making a plan to quit. Go to [employer] and clink on   [employer]  for help   Wear sunscreen with at least SPF15 to prevent skin damage and skin cancer.   Brush your teeth twice a day and floss once a day and see your dentist twice a year for an exam and cleaning.   Have a great year and I will look forward to seeing you next year.   Lottie Ace, DO          Follow-ups after your visit        Follow-up notes from your care team     Return in about 1  "year (around 9/14/2019).      Your next 10 appointments already scheduled     Sep 17, 2018  8:50 AM CDT   Office Visit [facility] Louisa Gallegos [facility]-C   [facility]    [address] [facility]  [facility] [address] [phone]           Bring a current list of meds and any records pertaining to this visit. For Physicals, please bring immunization records and any forms needing to be filled out. Please arrive 10 minutes early to complete paperwork.              Who to contact     If you have questions or need follow up information about today's clinic visit or your schedule please contact [facility] directly at [phone].  Normal or non-critical lab and imaging results will be communicated to you by cacaoTVhart, letter or phone within 4 business days after the clinic has received the results. If you do not hear from us within 7 days, please contact the clinic through CrossChxt or phone. If you have a critical or abnormal lab result, we will notify you by phone as soon as possible.  Submit refill requests through Venturocket or call your pharmacy and they will forward the refill request to us. Please allow 3 business days for your refill to be completed.          Additional Information About Your Visit        Venturocket Information     Venturocket gives you secure access to your electronic health record. If you see a primary care provider, you can also send messages to your care team and make appointments. If you have questions, please call your primary care clinic.  If you do not have a primary care provider, please call [phone] and they will assist you.        Care EveryWhere ID     This is your Care EveryWhere ID. This could be used by other organizations to access your [facility] medical records  [id number]        Your Vitals Were     Pulse Height Last Period BMI (Body Mass Index)          72 5' 5.75\" (1.67 m) 08/22/2018 39.52 kg/m2         Blood Pressure from Last 3 " Encounters:   09/14/18 (!) 142/100   08/26/17 120/78   08/25/17 130/89    Weight from Last 3 Encounters:   09/14/18 243 lb (110.2 kg)   08/30/17 240 lb (108.9 kg)   08/26/17 240 lb (108.9 kg)              Today, you had the following     No orders found for display       Primary Care Provider Office Phone # Fax #    Louisa Gallegos PA-C [phone]       [address] [place] [id number]        Equal Access to Services     [facility]: Hadii williams ku hadasho Soomaali, waaxda luqadaha, qaybta kaalmada adekandiceyazay, aleja flores . So [facility] [phone].    ATENCIÓN: Si habla español, tiene a zuleta disposición servicios gratuitos de asistencia lingüística. [facility] [phone].    We comply with applicable federal civil rights laws and [place] laws. We do not discriminate on the basis of race, color, national origin, age, disability, sex, sexual orientation, or gender identity.            Thank you!     Thank you for choosing [facility]  for your care. Our goal is always to provide you with excellent care. Hearing back from our patients is one way we can continue to improve our services. Please take a few minutes to complete the written survey that you may receive in the mail after your visit with us. Thank you!             Your Updated Medication List - Protect others around you: Learn how to safely use, store and throw away your medicines at www.disposemymeds.org.          This list is accurate as of 9/14/18 10:57 AM.  Always use your most recent med list.                   Brand Name Dispense Instructions for use Diagnosis    ALEVE PO      Take 550 mg by mouth 2 times daily as needed for moderate pain        amoxicillin-clavulanate 875-125 MG per tablet    AUGMENTIN    20 tablet    Take 1 tablet by mouth 2 times daily    Acute mucoid otitis media of left ear       losartan 25 MG tablet    COZAAR    90 tablet    Take 1 tablet (25 mg) by mouth daily    Benign essential  hypertension       methylPREDNISolone 4 MG tablet    MEDROL DOSEPAK    21 tablet    Follow package directions.        MOTRIN IB PO      Take 600 mg by mouth every 6 hours as needed for moderate pain        prednisoLONE sodium phosphate 1 % ophthalmic solution    INFLAMASE FORTE    5 mL    5 drops in left ear twice a day    Acute suppurative otitis media of left ear without spontaneous rupture of tympanic membrane, recurrence not specified

## 2018-09-14 NOTE — PROGRESS NOTES
Chief Complaint   Patient presents with     Physical       Subjective  Yumi Bhatti is a 37 year old female who presents for her annual exam.  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  Her menses are regular, monthly.  She bleeds for 3 days.  Her last menstrual period was .  Patient uses pads and has to change them every couple of hours.  No problems urinating.  Normal bowel movements.  No fever or chills.  No vaccines needed.  Patient is sexually active.  No dyspareunia.  No vaginal spotting after.  4 c sections.  Tubal ligation.  Patient complains of vertigo.  She has an appointment with FP on Monday.  She has HTN as well.      Most recent pap:   History of abnormal Pap smear: No  History of STI's:  No  History of PID:   No    Family history of uterine cancer:   No  Family history of ovarian cancer:  No  Family history of colon cancer:   No  Family history of breast cancer:  No    ROS  ROS: 10 point ROS neg other than the symptoms noted above in the HPI.    Past Medical History:   Diagnosis Date     Depressive disorder, not elsewhere classified     post-partum     Essential hypertension with goal blood pressure less than 140/90 2016     Hypertension      Hypothyroidism, unspecified type 2016     Migraines      Other complication of obstetrical surgical wounds, postpartum condition or complication 06/28/10    D/C 07/02/10     Pelvic peritoneal adhesions, female (postoperative) (postinfection)      Previous  delivery, delivered, with or without mention of antepartum condition 06/17/10    D/C 06/20/10     Unspecified septicemia(038.9) (H)      Past Surgical History:   Procedure Laterality Date     C/SECTION, LOW TRANSVERSE  2006     C/SECTION, LOW TRANSVERSE  2007    , Low Transverse     C/SECTION, LOW TRANSVERSE  06/17/10    , Low Transverse     C/SECTION, LOW TRANSVERSE  13    ERCS with TL     HC TOOTH EXTRACTION W/FORCEP       "age 22.     LAPAROSCOPIC SALPINGECTOMY  3/1/2012    Procedure:LAPAROSCOPIC SALPINGECTOMY; Laparoscopic Left Salpingectomy; Surgeon:LUIS GARCIA; Location:[facility]     TUBAL LIGATION  5/14/13     TUBAL/ECTOPIC PREGNANCY  2012     Family History   Problem Relation Age of Onset     Diabetes Mother      adult     Depression Mother      Hypertension Mother      Cerebrovascular Disease Mother      has had three strokes in  age 30's.     Alcohol/Drug Mother      alcoholic     Psychotic Disorder Mother      Alcohol/Drug Father      alcoholic and drug abuse     Depression Maternal Grandmother      Alcohol/Drug Maternal Grandmother      Diabetes Maternal Grandfather      Depression Maternal Grandfather      Arthritis Maternal Grandfather      Alcohol/Drug Maternal Grandfather      Depression Brother      Alcohol/Drug Brother      has seven half  brothers and all have alcohol and drug abuse.     Alcohol/Drug Paternal Grandmother      Alcohol/Drug Paternal Grandfather      Psychotic Disorder Brother      Social History   Substance Use Topics     Smoking status: Never Smoker     Smokeless tobacco: Never Used      Comment: no smokers in the household     Alcohol use Yes      Comment: once or twice a month       Tobacco abuse:  No  Do you get at least three servings of calcium containing foods daily (dairy, green leafy vegetables, etc.)? yes   Outside of work or daily activities, how many days per week do you exercise for 30 minutes or longer? 3-4x per week  The patient does not drink >3 drinks per day nor >7 drinks per week.   Have you had an eye exam in the past two years? yes   Do you see a dentist twice per year? yes   Today's PHQ-2 Score:   Abuse: Current or Past(Physical, Sexual or Emotional)- No   Do you feel safe in your environment - Yes  Objective  Vitals: BP (!) 142/100 (Patient Position: Sitting, Cuff Size: Adult Regular)  Pulse 72  Ht 5' 5.75\" (1.67 m)  Wt 243 lb (110.2 kg)  LMP 08/22/2018  BMI 39.52 " kg/m2  BMI= Body mass index is 39.52 kg/(m^2).    General appearance=well developed, well-nourished female  Psych=mood is stable  Skin=no rashes or lesions seen  Breast:  Benign exam, no masses palpated.  No skin changes, no axillary lymphadenopathy, no nipple discharge.  Axilla feel completely normal, no lymph node enlargement and non-tender.  Neck=overall appearance is normal  Heart=RRR, no murmurs, no swelling noted  Thyroid=normal, no masses, no TTP, no enlargement  Lungs=non-labored breathing, no use of accessory muscles, clear to ausculation bilaterally  Abd=soft, Nontender/nondistended, +bowel sounds x4, no masses, no signs of hernias, no evidence of hepatosplenomegaly  PELVIC:    External genitalia: normal without lesions or masses  Urethral meatus: no lesions or prolapse noted, normal size  Urethra: no masses, non tender  Bladder: non tender, no fullness  Vagina: normal mucosa and rugae, no discharge.  Cervix: normal without lesion, no cervical motion tenderness, healthy, nulliparous  Uterus: small, mobile, nontender.  Adnexa: non tender, without masses  Rectal: deferred  Ext=no clubbing or cyanosis, no swelling      Last lipid profile: 2016  Regular self breast exam: No  Most recent mammogram:  2015  History of abnormal mammogram:  No  Fit testin-normal  DEXA:  N/A        Assessment/Plan  1.)  Annual/well woman exam  2.)  Vertigo=fu with FP  3.)  Anxiety/depression=stable  4.)  HTN=fu with FP      The following topics were discussed or recommended   Discussed seat belt, helmet and sunscreen use  Vision screening   Calcium/Vitamin D supplement=Recommended 1000 mg of calcium daily and 800 IU of vitamin D.    25 minutes was spent face to face with the patient today discussing her history, diagnosis, and follow-up plan as noted above.  Over 50% of the visit was spent in counseling and coordination of care.    Total Visit Time: 30 minutes        Lottie Ace

## 2018-09-14 NOTE — TELEPHONE ENCOUNTER
RN attempted patient, unable to receive calls at this time. Will attempt at a later time. Padmini Valverde RN, BSN

## 2018-09-15 ASSESSMENT — PATIENT HEALTH QUESTIONNAIRE - PHQ9: SUM OF ALL RESPONSES TO PHQ QUESTIONS 1-9: 5

## 2018-09-17 ENCOUNTER — TELEPHONE (OUTPATIENT)
Dept: FAMILY MEDICINE | Facility: OTHER | Age: 38
End: 2018-09-17

## 2018-09-17 NOTE — TELEPHONE ENCOUNTER
Reason for call:  Patient reporting a symptom    Symptom or request: dizziness    Duration (how long have symptoms been present): a couple of months    Have you been treated for this before? No    Additional comments: patient had appt with Emalie today that was rescheduled as Emalie is out.  It is for dizziness. On the appt notes it said it was unable to be triaged as the number was not in service. Please call her at number below. She did decline triage and only wanted an appt.    Phone Number patient can be reached at:  Home number on file [phone] (home)    Best Time:  any    Can we leave a detailed message on this number:  YES    Call taken on 9/17/2018 at 3:04 PM by Sharee Kelly

## 2018-09-18 NOTE — TELEPHONE ENCOUNTER
Was informed by GG on 9/14/18 to follow up with FP for vertigo.    Next 5 appointments (look out 90 days)     Sep 21, 2018  8:00 AM CDT   Office Visit with Louisa Gallegos PA-C   [facility] ([facility])    [address] [place] [id number]   [phone]                Carol Edward, RN, BSN

## 2018-09-24 ENCOUNTER — TELEPHONE (OUTPATIENT)
Dept: FAMILY MEDICINE | Facility: OTHER | Age: 38
End: 2018-09-24

## 2018-09-24 NOTE — PROGRESS NOTES
"  SUBJECTIVE:   Yumi Bhatti is a 37 year old female who presents to clinic today for the following health issues:    Client is requesting a full blood work up.   Client has a fungal infection on one of her toes.     HPI  Dizziness  Onset: Beginning of July   Started with an episode of vertigo. Now client feels off. Client can be in a store and feels off. Client feels woozy.     Description:   Do you feel faint:  no   Does it feel like the surroundings (bed, room) are moving: no   Unsteady/off balance: YES  Have you passed out or fallen: no     Intensity: moderate    Progression of Symptoms:  improving and intermittent    Accompanying Signs & Symptoms:  Heart palpitations: no   Nausea, vomiting: no   Weakness in arms or legs: no   Fatigue: YES - Comes with the symptoms.   Vision or speech changes: no   Ringing in ears (Tinnitus): no   Hearing Loss: no   Chest pain, shortness of breath: no   Numbness/tingling: no   Client states she gets cold and sweaty.     History:   Head trauma/concussion hx: no   Previous similar symptoms: no   Recent bleeding history: no  Recent illness: no     Precipitating factors:   Worse with activity or head movement: YES  Any new medications (BP?): no   Alcohol/drug abuse/withdrawal: no   OTC supplements: no   Recent travel: YES- From [facility]    Alleviating factors:   Does staying in a fixed position give relief:  YES    Therapies Tried and outcome: Chiropractor     - First happened in July, woke up (they live in [facility]).  She woke up and feeling upside down and was nauseated and vomiting.  The next day it was slight  - She has tried Chiropractor, staying still.   - She is \"way way way\" better than she was in the beginning.   - Goes back in 2 weeks.   - has been still having episodes.  Episodes lasting < hour so long as she can go and rest.   - Her symptoms currently feel like she is been drinking a little.    - She saw a chiropractor in [facility] and they did Eply maneuver it " helped but did not completely resolve her symptoms, she has had subsequent treatments as well without more improvement.   - She declines any known rashes, bug bites or any other concerns in regards to her travels that would have resulted in this.   - She had a cat scan done in [place] a while back because she was going through episodes of hearing wooshing in her ears but that has resolved and her scan was normal       - She has also been dealing with hypertrophy of her right second toe for a while now.  It is very painful. She has had this in the past and took oral medication and it went away. Others in her family have had similar issues.     - Her BP is ok, slightly in the higher end of normal/prehypertensive range.  She is on Lisinopril 10 mg once daily right now that she thinks of - receives this in [facility].  She doesn't recall any dose adjustments recently or changes in the formulation of the medication relating to her vertigo symptoms.     - She has a history of hypothyroidism and was on Synthroid, has now been off for a year.   - She can't figure out why she isn't losing weight.  She is low carb and sugar in [facility], good portion control and they participate in [employer] and she isn't losing weight.      Problem list and histories reviewed & adjusted, as indicated.  Additional history: as documented    Patient Active Problem List   Diagnosis     Obesity     Sacroiliitis, not elsewhere classified (H)     Hemorrhoids     Insomnia     CARDIOVASCULAR SCREENING; LDL GOAL LESS THAN 160     Health Care Home     Bulge of lumbar disc without myelopathy     CKD (chronic kidney disease) stage 2, GFR 60-89 ml/min     Hypertension goal BP (blood pressure) < 140/90     Migraine     Coccydynia     Major depression in complete remission (H)     Essential hypertension with goal blood pressure less than 140/90     Hypothyroidism, unspecified type     Obesity (BMI 35.0-39.9) with comorbidity (H)     Past Surgical History:    Procedure Laterality Date     C/SECTION, LOW TRANSVERSE  2006     C/SECTION, LOW TRANSVERSE  2007    , Low Transverse     C/SECTION, LOW TRANSVERSE  06/17/10    , Low Transverse     C/SECTION, LOW TRANSVERSE  13    ERCS with TL     HC TOOTH EXTRACTION W/FORCEP      age 22.     LAPAROSCOPIC SALPINGECTOMY  3/1/2012    Procedure:LAPAROSCOPIC SALPINGECTOMY; Laparoscopic Left Salpingectomy; Surgeon:LUIS GARCIA; Location:[facility]     TUBAL LIGATION  14     TUBAL/ECTOPIC PREGNANCY         Social History   Substance Use Topics     Smoking status: Never Smoker     Smokeless tobacco: Never Used      Comment: no smokers in the household     Alcohol use Yes      Comment: once or twice a month     Family History   Problem Relation Age of Onset     Diabetes Mother      adult     Depression Mother      Hypertension Mother      Cerebrovascular Disease Mother      has had three strokes in  age 30's.     Alcohol/Drug Mother      alcoholic     Psychotic Disorder Mother      Alcohol/Drug Father      alcoholic and drug abuse     Depression Maternal Grandmother      Alcohol/Drug Maternal Grandmother      Diabetes Maternal Grandfather      Depression Maternal Grandfather      Arthritis Maternal Grandfather      Alcohol/Drug Maternal Grandfather      Depression Brother      Alcohol/Drug Brother      has seven half  brothers and all have alcohol and drug abuse.     Alcohol/Drug Paternal Grandmother      Alcohol/Drug Paternal Grandfather      Psychotic Disorder Brother          Current Outpatient Prescriptions   Medication Sig Dispense Refill     Naproxen Sodium (ALEVE PO) Take 550 mg by mouth 2 times daily as needed for moderate pain       terbinafine (LAMISIL) 250 MG tablet Take 1 tablet (250 mg) by mouth daily 90 tablet 0     MOTRIN IB PO Take 600 mg by mouth every 6 hours as needed for moderate pain       Allergies   Allergen Reactions     Macrobid [Nitrofuran Derivatives] GI Disturbance  "    Latex Rash       ROS:  Constitutional, HEENT, cardiovascular, pulmonary, GI, , musculoskeletal, neuro, skin, endocrine and psych systems are negative, except as otherwise noted.    OBJECTIVE:     /90 (BP Location: Left arm, Patient Position: Sitting, Cuff Size: Adult Large)  Pulse 76  Temp 97  F (36.1  C) (Temporal)  Resp 14  Ht 5' 5.75\" (1.67 m)  Wt 243 lb (110.2 kg)  SpO2 97%  BMI 39.52 kg/m2  Body mass index is 39.52 kg/(m^2).  GENERAL: healthy, alert and no distress  EYES: Eyes grossly normal to inspection, PERRL and conjunctivae and sclerae normal  HENT: ear canals and TM's normal, nose and mouth without ulcers or lesions  NECK: no adenopathy, no asymmetry, masses, or scars and thyroid normal to palpation  RESP: lungs clear to auscultation - no rales, rhonchi or wheezes  CV: regular rate and rhythm, normal S1 S2, no S3 or S4, no murmur, click or rub, no peripheral edema and peripheral pulses strong  ABDOMEN: soft, nontender, no hepatosplenomegaly, no masses and bowel sounds normal  MS: no gross musculoskeletal defects noted, no edema, 5/5 upper and lower extremity strength bilaterally.   SKIN: no suspicious lesions or rashes, right second toenail, mild discoloration noted and hypertrophic.   NEURO: Normal strength and tone, sensory exam grossly normal, mentation intact, cranial nerves 2-12 intact, DTR's normal and symmetric 2+ patellar, gait normal including heel/toe/tandem walking, Romberg normal and rapid alternating movements normal  PSYCH: mentation appears normal, affect normal/bright    Diagnostic Test Results:  Pending    ASSESSMENT/PLAN:       ICD-10-CM    1. Vertigo R42 TSH WITH FREE T4 REFLEX     CBC with platelets differential     Comprehensive metabolic panel     Vitamin D Deficiency     [facility] REFERRAL   2. Morbid obesity (H) E66.01 Cortisol   3. Onychomycosis B35.1 terbinafine (LAMISIL) 250 MG tablet   4. Hypertension goal BP (blood pressure) < 140/90 I10 "    5. CARDIOVASCULAR SCREENING; LDL GOAL LESS THAN 160 Z13.6 Lipid panel reflex to direct LDL Non-fasting   6. CKD (chronic kidney disease) stage 2, GFR 60-89 ml/min N18.2 Albumin Random Urine Quantitative with Creat Ratio     Comprehensive metabolic panel   7. Benign essential hypertension I10 Albumin Random Urine Quantitative with Creat Ratio     TSH WITH FREE T4 REFLEX     CBC with platelets differential     Comprehensive metabolic panel     **Basic metabolic panel FUTURE 14d       1. Uncertain the etiology. Based on her history of symptoms appears consistent with BPPV but having persistent symptoms.  [place] if [place] can resolve her symptoms.  Will check above lab work as patient has a history of hypothyroidism and she has regular periods.  She also had HTN and needs to have kidney re-checked.  We will call with labs. If normal consider MRI imaging (patient  recommended imaging because they are going back to [facility] again very soon).  No concerns on her entire examination today.      2. We discussed most medications utilized are more for appetite suppression.  They also require monitoring.  We will check thyroid and if abnormal recommend treatment before checking additional endocrine labs - consider Cortisol levels, hormone levels.  She states she has a history of cysts on her ovaries, possibly more PCOS, consider Metformin but we discussed risks and benefits including side effects from the medication.     3. For the toenail we discussed risks with Lamisil, needs to have liver function re-checked, will do this when in [facility] in 1 month and send results, so long as no abnormalities ok to continue.     4-7: due for re-check on labs.  Recommended we increase her Lisinopril to 20 mg once daily and recheck BMP and BP on Friday, if better controlled will send in Rx for Lisinopril 20 mg.  Patient needs 6 months of medication and will work with pharmacy and  insurance to get this approved.      Follow-up on Friday for re-check on labs and BP, we will make a plan from there and after her physical therapy appointment.     Options for treatment and follow-up care were reviewed with the patient and/or guardian. Patient and/or guardian engaged in the decision making process and verbalized understanding of the options discussed and agreed with the final plan.     Louisa Gallegos PA-C  [facility]

## 2018-09-25 ENCOUNTER — OFFICE VISIT (OUTPATIENT)
Dept: FAMILY MEDICINE | Facility: OTHER | Age: 38
End: 2018-09-25
Payer: COMMERCIAL

## 2018-09-25 VITALS
WEIGHT: 243 LBS | SYSTOLIC BLOOD PRESSURE: 132 MMHG | HEIGHT: 66 IN | TEMPERATURE: 97 F | RESPIRATION RATE: 14 BRPM | HEART RATE: 76 BPM | OXYGEN SATURATION: 97 % | DIASTOLIC BLOOD PRESSURE: 90 MMHG | BODY MASS INDEX: 39.05 KG/M2

## 2018-09-25 DIAGNOSIS — R42 VERTIGO: Primary | ICD-10-CM

## 2018-09-25 DIAGNOSIS — N18.2 CKD (CHRONIC KIDNEY DISEASE) STAGE 2, GFR 60-89 ML/MIN: ICD-10-CM

## 2018-09-25 DIAGNOSIS — E66.01 MORBID OBESITY (H): ICD-10-CM

## 2018-09-25 DIAGNOSIS — I10 BENIGN ESSENTIAL HYPERTENSION: ICD-10-CM

## 2018-09-25 DIAGNOSIS — B35.1 ONYCHOMYCOSIS: ICD-10-CM

## 2018-09-25 DIAGNOSIS — I10 HYPERTENSION GOAL BP (BLOOD PRESSURE) < 140/90: ICD-10-CM

## 2018-09-25 DIAGNOSIS — Z13.6 CARDIOVASCULAR SCREENING; LDL GOAL LESS THAN 160: ICD-10-CM

## 2018-09-25 LAB
BASOPHILS # BLD AUTO: 0 10E9/L (ref 0–0.2)
BASOPHILS NFR BLD AUTO: 0.7 %
DIFFERENTIAL METHOD BLD: NORMAL
EOSINOPHIL # BLD AUTO: 0.2 10E9/L (ref 0–0.7)
EOSINOPHIL NFR BLD AUTO: 3.7 %
ERYTHROCYTE [DISTWIDTH] IN BLOOD BY AUTOMATED COUNT: 12.9 % (ref 10–15)
HCT VFR BLD AUTO: 42.8 % (ref 35–47)
HGB BLD-MCNC: 14.2 G/DL (ref 11.7–15.7)
LYMPHOCYTES # BLD AUTO: 1.8 10E9/L (ref 0.8–5.3)
LYMPHOCYTES NFR BLD AUTO: 30.3 %
MCH RBC QN AUTO: 31.4 PG (ref 26.5–33)
MCHC RBC AUTO-ENTMCNC: 33.2 G/DL (ref 31.5–36.5)
MCV RBC AUTO: 95 FL (ref 78–100)
MONOCYTES # BLD AUTO: 0.5 10E9/L (ref 0–1.3)
MONOCYTES NFR BLD AUTO: 8.1 %
NEUTROPHILS # BLD AUTO: 3.4 10E9/L (ref 1.6–8.3)
NEUTROPHILS NFR BLD AUTO: 57.2 %
PLATELET # BLD AUTO: 203 10E9/L (ref 150–450)
RBC # BLD AUTO: 4.52 10E12/L (ref 3.8–5.2)
WBC # BLD AUTO: 6 10E9/L (ref 4–11)

## 2018-09-25 PROCEDURE — 80061 LIPID PANEL: CPT | Performed by: PHYSICIAN ASSISTANT

## 2018-09-25 PROCEDURE — 84443 ASSAY THYROID STIM HORMONE: CPT | Performed by: PHYSICIAN ASSISTANT

## 2018-09-25 PROCEDURE — 99214 OFFICE O/P EST MOD 30 MIN: CPT | Performed by: PHYSICIAN ASSISTANT

## 2018-09-25 PROCEDURE — 82306 VITAMIN D 25 HYDROXY: CPT | Performed by: PHYSICIAN ASSISTANT

## 2018-09-25 PROCEDURE — 36415 COLL VENOUS BLD VENIPUNCTURE: CPT | Performed by: PHYSICIAN ASSISTANT

## 2018-09-25 PROCEDURE — 85025 COMPLETE CBC W/AUTO DIFF WBC: CPT | Performed by: PHYSICIAN ASSISTANT

## 2018-09-25 PROCEDURE — 80053 COMPREHEN METABOLIC PANEL: CPT | Performed by: PHYSICIAN ASSISTANT

## 2018-09-25 PROCEDURE — 82043 UR ALBUMIN QUANTITATIVE: CPT | Performed by: PHYSICIAN ASSISTANT

## 2018-09-25 RX ORDER — TERBINAFINE HYDROCHLORIDE 250 MG/1
250 TABLET ORAL DAILY
Qty: 90 TABLET | Refills: 0 | Status: SHIPPED | OUTPATIENT
Start: 2018-09-25 | End: 2020-05-08 | Stop reason: ALTCHOICE

## 2018-09-25 ASSESSMENT — PAIN SCALES - GENERAL: PAINLEVEL: NO PAIN (0)

## 2018-09-25 NOTE — LETTER
My Depression Action Plan  Name: Yumi Bhatti   Date of Birth 1980  Date: 9/24/2018    My doctor: Louisa Gallegos   My clinic: [address]  [facility] [id number] [phone]          GREEN    ZONE   Good Control    What it looks like:     Things are going generally well. You have normal up s and down s. You may even feel depressed from time to time, but bad moods usually last less than a day.   What you need to do:  1. Continue to care for yourself (see self care plan)  2. Check your depression survival kit and update it as needed  3. Follow your physician s recommendations including any medication.  4. Do not stop taking medication unless you consult with your physician first.           YELLOW         ZONE Getting Worse    What it looks like:     Depression is starting to interfere with your life.     It may be hard to get out of bed; you may be starting to isolate yourself from others.    Symptoms of depression are starting to last most all day and this has happened for several days.     You may have suicidal thoughts but they are not constant.   What you need to do:     1. Call your care team, your response to treatment will improve if you keep your care team informed of your progress. Yellow periods are signs an adjustment may need to be made.     2. Continue your self-care, even if you have to fake it!    3. Talk to someone in your support network    4. Open up your depression survival kit           RED    ZONE Medical Alert - Get Help    What it looks like:     Depression is seriously interfering with your life.     You may experience these or other symptoms: You can t get out of bed most days, can t work or engage in other necessary activities, you have trouble taking care of basic hygiene, or basic responsibilities, thoughts of suicide or death that will not go away, self-injurious behavior.     What you need to do:  1. Call your care team and  request a same-day appointment. If they are not available (weekends or after hours) call your local crisis line, emergency room or 911.            Depression Self Care Plan / Survival Kit    Self-Care for Depression  Here s the deal. Your body and mind are really not as separate as most people think.  What you do and think affects how you feel and how you feel influences what you do and think. This means if you do things that people who feel good do, it will help you feel better.  Sometimes this is all it takes.  There is also a place for medication and therapy depending on how severe your depression is, so be sure to consult with your medical provider and/ or Behavioral Health Consultant if your symptoms are worsening or not improving.     In order to better manage my stress, I will:    Exercise  Get some form of exercise, every day. This will help reduce pain and release endorphins, the  feel good  chemicals in your brain. This is almost as good as taking antidepressants!  This is not the same as joining a gym and then never going! (they count on that by the way ) It can be as simple as just going for a walk or doing some gardening, anything that will get you moving.      Hygiene   Maintain good hygiene (Get out of bed in the morning, Make your bed, Brush your teeth, Take a shower, and Get dressed like you were going to work, even if you are unemployed).  If your clothes don't fit try to get ones that do.    Diet  I will strive to eat foods that are good for me, drink plenty of water, and avoid excessive sugar, caffeine, alcohol, and other mood-altering substances.  Some foods that are helpful in depression are: complex carbohydrates, B vitamins, flaxseed, fish or fish oil, fresh fruits and vegetables.    Psychotherapy  I agree to participate in Individual Therapy (if recommended).    Medication  If prescribed medications, I agree to take them.  Missing doses can result in serious side effects.  I understand that  drinking alcohol, or other illicit drug use, may cause potential side effects.  I will not stop my medication abruptly without first discussing it with my provider.    Staying Connected With Others  I will stay in touch with my friends, family members, and my primary care provider/team.    Use your imagination  Be creative.  We all have a creative side; it doesn t matter if it s oil painting, sand castles, or mud pies! This will also kick up the endorphins.    Witness Beauty  (AKA stop and smell the roses) Take a look outside, even in mid-winter. Notice colors, textures. Watch the squirrels and birds.     Service to others  Be of service to others.  There is always someone else in need.  By helping others we can  get out of ourselves  and remember the really important things.  This also provides opportunities for practicing all the other parts of the program.    Humor  Laugh and be silly!  Adjust your TV habits for less news and crime-drama and more comedy.    Control your stress  Try breathing deep, massage therapy, biofeedback, and meditation. Find time to relax each day.     My support system    Clinic Contact:  Phone number:    Contact 1:  Phone number:    Contact 2:  Phone number:    Nondenominational/:  Phone number:    Therapist:  Phone number:    Local crisis center:    Phone number:    Other community support:  Phone number:

## 2018-09-25 NOTE — MR AVS SNAPSHOT
After Visit Summary   9/25/2018    Yumi Bhatti    MRN: [id number]           Patient Information     Date Of Birth          1980        Visit Information        Provider Department      9/25/2018 4:00 PM Louisa Gallegos PA-C [facility]        Today's Diagnoses     Hypertension goal BP (blood pressure) < 140/90    -  1    Need for prophylactic vaccination and inoculation against influenza        CARDIOVASCULAR SCREENING; LDL GOAL LESS THAN 160        CKD (chronic kidney disease) stage 2, GFR 60-89 ml/min        Benign essential hypertension        Morbid obesity (H)        Vertigo        Onychomycosis          Care Instructions    Take 20 mg of Lisinopril once daily for next 2-3 days and then come in for a blood pressure check and recheck kidneys -Nurse/Lab    We'll call with labs today.     Start Lamisil - re-check your liver function tests in 1 month.     Set up with [facility] physical therapy for Vertigo assessment.           Follow-ups after your visit        Additional Services     [facility] PT, HAND, AND CHIROPRACTIC REFERRAL       Physical Therapy, Hand Therapy and Chiropractic Care are available through:  *[facility]  *Hand Therapy (Occupational Therapy or Physical Therapy)  *[facility]    Call one number to schedule at any of the above locations: [phone].    Physical therapy, Hand therapy and/or Chiropractic care has been recommended by your physician as an excellent treatment option to reduce pain and help people return to normal activities, including sports.  Therapy and/or chiropractic care services are a great complement or alternative to expensive and invasive surgery, injections, or long-term use of prescription medications. The primary goal is to identify the underlying problem and provide you the tools to manage your condition on your own.     Please be aware that coverage of these services is subject to the terms and  limitations of your health insurance plan.  Call member services at your health plan with any benefit or coverage questions.      Please bring the following to your appointment:  *Your personal calendar for scheduling future appointments  *Comfortable clothing                  Follow-up notes from your care team     Return in about 3 days (around 9/28/2018) for BP Recheck, Lab Work.      Your next 10 appointments already scheduled     Sep 28, 2018  9:00 AM CDT   LAB with NL LAB [facility]    [address] [facility] [address]   [phone]           Please do not eat 10-12 hours before your appointment if you are coming in fasting for labs on lipids, cholesterol, or glucose (sugar). This does not apply to pregnant women. Water, hot tea and black coffee (with nothing added) are okay. Do not drink other fluids, diet soda or chew gum.            Sep 28, 2018  9:30 AM CDT   Nurse Only with NL FLOAT TEAM B, [facility]    [address] [address] [phone]              Future tests that were ordered for you today     Open Future Orders        Priority Expected Expires Ordered    [facility] PT, HAND, AND CHIROPRACTIC REFERRAL Routine  9/25/2019 9/25/2018    Cortisol Routine  9/25/2019 9/25/2018    **Basic metabolic panel FUTURE 14d Routine 10/2/2018 10/9/2018 9/25/2018            Who to contact     If you have questions or need follow up information about today's clinic visit or your schedule please contact [facility] directly at [phone].  Normal or non-critical lab and imaging results will be communicated to you by MyChart, letter or phone within 4 business days after the clinic has received the results. If you do not hear from us within 7 days, please contact the clinic through MyChart or phone. If you have a critical or abnormal lab result, we will notify you by phone as  "soon as possible.  Submit refill requests through [employer] or call your pharmacy and they will forward the refill request to us. Please allow 3 business days for your refill to be completed.          Additional Information About Your Visit        Winkhar[employer]Page Information     [employer] gives you secure access to your electronic health record. If you see a primary care provider, you can also send messages to your care team and make appointments. If you have questions, please call your primary care clinic.  If you do not have a primary care provider, please call [phone] and they will assist you.        Care EveryWhere ID     This is your Care EveryWhere ID. This could be used by other organizations to access your [facility] medical records  [id number]        Your Vitals Were     Pulse Temperature Respirations Height Pulse Oximetry BMI (Body Mass Index)    76 97  F (36.1  C) (Temporal) 14 5' 5.75\" (1.67 m) 97% 39.52 kg/m2       Blood Pressure from Last 3 Encounters:   09/25/18 132/90   09/14/18 (!) 142/100   08/26/17 120/78    Weight from Last 3 Encounters:   09/25/18 243 lb (110.2 kg)   09/14/18 243 lb (110.2 kg)   08/30/17 240 lb (108.9 kg)              We Performed the Following     Albumin Random Urine Quantitative with Creat Ratio     CBC with platelets differential     Comprehensive metabolic panel     Lipid panel reflex to direct LDL Non-fasting     TSH WITH FREE T4 REFLEX     Vitamin D Deficiency          Today's Medication Changes          These changes are accurate as of 9/25/18  4:28 PM.  If you have any questions, ask your nurse or doctor.               Start taking these medicines.        Dose/Directions    terbinafine 250 MG tablet   Commonly known as:  lamISIL   Used for:  Onychomycosis   Started by:  Louisa Gallegos PA-C        Dose:  250 mg   Take 1 tablet (250 mg) by mouth daily   Quantity:  90 tablet   Refills:  0         Stop taking these medicines if you haven't already. Please contact your care team if " you have questions.     amoxicillin-clavulanate 875-125 MG per tablet   Commonly known as:  AUGMENTIN   Stopped by:  Louisa Gallegos PA-C           losartan 25 MG tablet   Commonly known as:  COZAAR   Stopped by:  Louisa Gallegos PA-C           methylPREDNISolone 4 MG tablet   Commonly known as:  MEDROL DOSEPAK   Stopped by:  Louisa Gallegos PA-C           prednisoLONE sodium phosphate 1 % ophthalmic solution   Commonly known as:  INFLAMASE FORTE   Stopped by:  Louisa Gallegos PA-C                Where to get your medicines      These medications were sent to [address] [address]     Phone:  [phone]     terbinafine 250 MG tablet                Primary Care Provider Office Phone # Fax #    Louisa Gallegos PA-C [phone]       [address] [facility] [address]        Equal Access to Services     [facility]: Hadii williams tee hadasho Soadelaidaali, waaxda luqadaha, qaybta kaalmada adeegyada, waxay tracyin erin flores . So [facility] [phone].    ATENCIÓN: Si habla español, tiene a zuleta disposición servicios gratuitos de asistencia lingüística. [facility] [phone].    We comply with applicable federal civil rights laws and [place] laws. We do not discriminate on the basis of race, color, national origin, age, disability, sex, sexual orientation, or gender identity.            Thank you!     Thank you for choosing [facility]  for your care. Our goal is always to provide you with excellent care. Hearing back from our patients is one way we can continue to improve our services. Please take a few minutes to complete the written survey that you may receive in the mail after your visit with us. Thank you!             Your Updated Medication List - Protect others around you: Learn how to safely use, store and throw away your medicines at www.disposemymeds.org.          This list is accurate as of 9/25/18  4:28  PM.  Always use your most recent med list.                   Brand Name Dispense Instructions for use Diagnosis    ALEVE PO      Take 550 mg by mouth 2 times daily as needed for moderate pain        MOTRIN IB PO      Take 600 mg by mouth every 6 hours as needed for moderate pain        terbinafine 250 MG tablet    lamISIL    90 tablet    Take 1 tablet (250 mg) by mouth daily    Onychomycosis

## 2018-09-25 NOTE — PATIENT INSTRUCTIONS
Take 20 mg of Lisinopril once daily for next 2-3 days and then come in for a blood pressure check and recheck kidneys -Nurse/Lab    We'll call with labs today.     Start Lamisil - re-check your liver function tests in 1 month.     Set up with [facility] physical therapy for Vertigo assessment.

## 2018-09-26 ENCOUNTER — MYC MEDICAL ADVICE (OUTPATIENT)
Dept: FAMILY MEDICINE | Facility: OTHER | Age: 38
End: 2018-09-26

## 2018-09-26 LAB
ALBUMIN SERPL-MCNC: 3.9 G/DL (ref 3.4–5)
ALP SERPL-CCNC: 37 U/L (ref 40–150)
ALT SERPL W P-5'-P-CCNC: 50 U/L (ref 0–50)
ANION GAP SERPL CALCULATED.3IONS-SCNC: 6 MMOL/L (ref 3–14)
AST SERPL W P-5'-P-CCNC: 26 U/L (ref 0–45)
BILIRUB SERPL-MCNC: 0.6 MG/DL (ref 0.2–1.3)
BUN SERPL-MCNC: 21 MG/DL (ref 7–30)
CALCIUM SERPL-MCNC: 8.9 MG/DL (ref 8.5–10.1)
CHLORIDE SERPL-SCNC: 105 MMOL/L (ref 94–109)
CHOLEST SERPL-MCNC: 217 MG/DL
CO2 SERPL-SCNC: 28 MMOL/L (ref 20–32)
CREAT SERPL-MCNC: 1.02 MG/DL (ref 0.52–1.04)
CREAT UR-MCNC: 197 MG/DL
GFR SERPL CREATININE-BSD FRML MDRD: 61 ML/MIN/1.7M2
GLUCOSE SERPL-MCNC: 86 MG/DL (ref 70–99)
HDLC SERPL-MCNC: 41 MG/DL
LDLC SERPL CALC-MCNC: 124 MG/DL
MICROALBUMIN UR-MCNC: 82 MG/L
MICROALBUMIN/CREAT UR: 41.57 MG/G CR (ref 0–25)
NONHDLC SERPL-MCNC: 176 MG/DL
POTASSIUM SERPL-SCNC: 4.5 MMOL/L (ref 3.4–5.3)
PROT SERPL-MCNC: 8 G/DL (ref 6.8–8.8)
SODIUM SERPL-SCNC: 139 MMOL/L (ref 133–144)
TRIGL SERPL-MCNC: 259 MG/DL
TSH SERPL DL<=0.005 MIU/L-ACNC: 1.08 MU/L (ref 0.4–4)

## 2018-09-27 LAB — DEPRECATED CALCIDIOL+CALCIFEROL SERPL-MC: 36 UG/L (ref 20–75)

## 2018-09-27 NOTE — TELEPHONE ENCOUNTER
Per lab, it is up to the provider if patient should be fasting for the BMP. Will route to PCP to advise if patient should be fasting.  Leisa Velazco, CMA

## 2018-09-28 ENCOUNTER — ALLIED HEALTH/NURSE VISIT (OUTPATIENT)
Dept: FAMILY MEDICINE | Facility: OTHER | Age: 38
End: 2018-09-28
Payer: COMMERCIAL

## 2018-09-28 ENCOUNTER — TELEPHONE (OUTPATIENT)
Dept: FAMILY MEDICINE | Facility: OTHER | Age: 38
End: 2018-09-28

## 2018-09-28 VITALS — HEART RATE: 76 BPM | DIASTOLIC BLOOD PRESSURE: 91 MMHG | SYSTOLIC BLOOD PRESSURE: 130 MMHG

## 2018-09-28 DIAGNOSIS — I10 HYPERTENSION GOAL BP (BLOOD PRESSURE) < 140/90: Primary | ICD-10-CM

## 2018-09-28 DIAGNOSIS — Z01.30 BP CHECK: Primary | ICD-10-CM

## 2018-09-28 DIAGNOSIS — I10 BENIGN ESSENTIAL HYPERTENSION: ICD-10-CM

## 2018-09-28 DIAGNOSIS — E66.01 MORBID OBESITY (H): ICD-10-CM

## 2018-09-28 LAB
ANION GAP SERPL CALCULATED.3IONS-SCNC: 7 MMOL/L (ref 3–14)
BUN SERPL-MCNC: 20 MG/DL (ref 7–30)
CALCIUM SERPL-MCNC: 8.4 MG/DL (ref 8.5–10.1)
CHLORIDE SERPL-SCNC: 104 MMOL/L (ref 94–109)
CO2 SERPL-SCNC: 27 MMOL/L (ref 20–32)
CORTIS SERPL-MCNC: 7.2 UG/DL (ref 4–22)
CREAT SERPL-MCNC: 0.8 MG/DL (ref 0.52–1.04)
GFR SERPL CREATININE-BSD FRML MDRD: 80 ML/MIN/1.7M2
GLUCOSE SERPL-MCNC: 92 MG/DL (ref 70–99)
POTASSIUM SERPL-SCNC: 4.3 MMOL/L (ref 3.4–5.3)
SODIUM SERPL-SCNC: 138 MMOL/L (ref 133–144)

## 2018-09-28 PROCEDURE — 84403 ASSAY OF TOTAL TESTOSTERONE: CPT | Performed by: PHYSICIAN ASSISTANT

## 2018-09-28 PROCEDURE — 80048 BASIC METABOLIC PNL TOTAL CA: CPT | Performed by: PHYSICIAN ASSISTANT

## 2018-09-28 PROCEDURE — 82533 TOTAL CORTISOL: CPT | Performed by: PHYSICIAN ASSISTANT

## 2018-09-28 PROCEDURE — 82024 ASSAY OF ACTH: CPT | Performed by: PHYSICIAN ASSISTANT

## 2018-09-28 PROCEDURE — 99207 ZZC NO CHARGE NURSE ONLY: CPT

## 2018-09-28 RX ORDER — LISINOPRIL 40 MG/1
40 TABLET ORAL DAILY
Qty: 90 TABLET | Refills: 1 | Status: SHIPPED | OUTPATIENT
Start: 2018-09-28 | End: 2020-07-30

## 2018-09-28 NOTE — NURSING NOTE
Yumi Bhatti is a 37 year old patient who comes in today for a Blood Pressure check.  Initial BP:  BP (!) 130/91  Pulse 76     76  Disposition: results routed to provider

## 2018-09-28 NOTE — TELEPHONE ENCOUNTER
10 mg was a low dose. 40 mg is the max dose.  Because of her protein in her urine I recommend that she have good blood pressure control, better than she currently has.  Does she want to increase the dose? If so will send to pharmacy and have her re-check BP and BMP on Monday.     Louisa Gallegos PA-C

## 2018-09-28 NOTE — TELEPHONE ENCOUNTER
Rx will be sent to the pharmacy.  Please come back in on Monday for re-check BMP and BP.   Potassium looks good today, no concerns.       Louisa Gallegos PA-C

## 2018-09-28 NOTE — TELEPHONE ENCOUNTER
Please contact patient.  Her BP is still elevated.      Either recommend we have her increase Lisinopril to 40 mg once daily or switch to a different medication such as Losartan.      Louisa Gallegos PA-C

## 2018-09-28 NOTE — TELEPHONE ENCOUNTER
Patient returned the call, message below was relayed to her and she stated yes she would like increase the dose.  New Rx can be sent to [facility].  She is also requesting additional refills and wondering if a potassium supplement can also be sent in?  She started the Keto diet yesterday and is feeling sick.

## 2018-09-28 NOTE — TELEPHONE ENCOUNTER
LM for patient to return phone call to clinic about message below.  Gail Ramirez CMA ([facility])

## 2018-09-28 NOTE — TELEPHONE ENCOUNTER
"Spoke with patient. She states that \"40mg is a lot, she was taking 10 mg and just recently switched to the 20mg.\" Needs a refill now though, as she is all out [place].    The only problem is she is unsure of if they will be able to find Losartan in [place].     Please review/advise.  Zuly Cho CMA    "

## 2018-09-28 NOTE — TELEPHONE ENCOUNTER
Patient in clinic for BP recheck, her readings were 142/96 and 130/91. She is out of Lisinopril. Will route to ES and covering providers to review and please send appropriate refills to [facility]. If it is after 3pm will need to contact patient for different pharmacy as she is leaving for out of town.     Patient also wanted to let ES know that she thinks it would be a good idea to start a Potassium supplement as she recently started the Keto diet and is concerned about getting enough potassium through her diet.

## 2018-09-28 NOTE — MR AVS SNAPSHOT
After Visit Summary   9/28/2018    Yumi Bhatti    MRN: [id number]           Patient Information     Date Of Birth          1980        Visit Information        Provider Department      9/28/2018 9:30 AM FLO ANN TEAM B, [facility]        Today's Diagnoses     BP check    -  1       Follow-ups after your visit        Who to contact     If you have questions or need follow up information about today's clinic visit or your schedule please contact [facility] directly at [phone].  Normal or non-critical lab and imaging results will be communicated to you by [facility], letter or phone within 4 business days after the clinic has received the results. If you do not hear from us within 7 days, please contact the clinic through [employer] or phone. If you have a critical or abnormal lab result, we will notify you by phone as soon as possible.  Submit refill requests through [employer] or call your pharmacy and they will forward the refill request to us. Please allow 3 business days for your refill to be completed.          Additional Information About Your Visit        MyChart Information     [employer] gives you secure access to your electronic health record. If you see a primary care provider, you can also send messages to your care team and make appointments. If you have questions, please call your primary care clinic.  If you do not have a primary care provider, please call [phone] and they will assist you.        Care EveryWhere ID     This is your Care EveryWhere ID. This could be used by other organizations to access your [facility] medical records  [id number]        Your Vitals Were     Pulse                   76            Blood Pressure from Last 3 Encounters:   09/28/18 (!) 130/91   09/25/18 132/90   09/14/18 (!) 142/100    Weight from Last 3 Encounters:   09/25/18 243 lb (110.2 kg)   09/14/18 243 lb (110.2 kg)   08/30/17 240 lb (108.9 kg)               Today, you had the following     No orders found for display       Primary Care Provider Office Phone # Fax #    Louisa Gallegos PA-C [phone]       [address] [place] [address]        Equal Access to Services     HUYEN CHENG : Hadii aad ku hadgerio Soomaali, waaxda luqadaha, qaybta kaalmada adeegyada, aleja kaylah haileypriti pugh francayahir roberto. So [facility] [phone].    ATENCIÓN: Si habla español, tiene a zuleta disposición servicios gratuitos de asistencia lingüística. Llame al [phone].    We comply with applicable federal civil rights laws and [place] laws. We do not discriminate on the basis of race, color, national origin, age, disability, sex, sexual orientation, or gender identity.            Thank you!     Thank you for choosing [facility]  for your care. Our goal is always to provide you with excellent care. Hearing back from our patients is one way we can continue to improve our services. Please take a few minutes to complete the written survey that you may receive in the mail after your visit with us. Thank you!             Your Updated Medication List - Protect others around you: Learn how to safely use, store and throw away your medicines at www.disposemymeds.org.          This list is accurate as of 9/28/18  9:52 AM.  Always use your most recent med list.                   Brand Name Dispense Instructions for use Diagnosis    ALEVE PO      Take 550 mg by mouth 2 times daily as needed for moderate pain        MOTRIN IB PO      Take 600 mg by mouth every 6 hours as needed for moderate pain        terbinafine 250 MG tablet    lamISIL    90 tablet    Take 1 tablet (250 mg) by mouth daily    Onychomycosis

## 2018-10-02 LAB
ACTH PLAS-MCNC: 20 PG/ML
TESTOST SERPL-MCNC: 26 NG/DL (ref 8–60)

## 2018-10-03 ENCOUNTER — HOSPITAL ENCOUNTER (OUTPATIENT)
Dept: PHYSICAL THERAPY | Facility: CLINIC | Age: 38
Setting detail: THERAPIES SERIES
End: 2018-10-03
Attending: PHYSICIAN ASSISTANT
Payer: COMMERCIAL

## 2018-10-03 ENCOUNTER — TELEPHONE (OUTPATIENT)
Dept: FAMILY MEDICINE | Facility: OTHER | Age: 38
End: 2018-10-03

## 2018-10-03 DIAGNOSIS — R42 VERTIGO: ICD-10-CM

## 2018-10-03 DIAGNOSIS — R42 VERTIGO: Primary | ICD-10-CM

## 2018-10-03 PROCEDURE — 97140 MANUAL THERAPY 1/> REGIONS: CPT | Mod: GP

## 2018-10-03 PROCEDURE — 40000840 ZZHC STATISTIC PT VESTIBULAR VISIT

## 2018-10-03 PROCEDURE — 97112 NEUROMUSCULAR REEDUCATION: CPT | Mod: GP

## 2018-10-03 PROCEDURE — 97162 PT EVAL MOD COMPLEX 30 MIN: CPT | Mod: GP

## 2018-10-03 NOTE — TELEPHONE ENCOUNTER
Anjali- Physical Therapist from [facility] to let you know that patient's BPPV test was negative. Patient is wondering if you want to order a MRI. She is still having mild dizziness.  Leisa Velazco CMA
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
## 2018-10-05 ENCOUNTER — HOSPITAL ENCOUNTER (OUTPATIENT)
Dept: MRI IMAGING | Facility: CLINIC | Age: 38
Discharge: HOME | End: 2018-10-05
Attending: PHYSICIAN ASSISTANT | Admitting: PHYSICIAN ASSISTANT
Payer: COMMERCIAL

## 2018-10-05 DIAGNOSIS — R42 VERTIGO: ICD-10-CM

## 2018-10-05 PROCEDURE — 70551 MRI BRAIN STEM W/O DYE: CPT

## 2018-10-09 NOTE — ADDENDUM NOTE
Encounter addended by: Anjali Wetzel, PT on: 10/9/2018  4:58 PM<BR>     Actions taken: Sign clinical note, Episode resolved

## 2018-10-09 NOTE — PROGRESS NOTES
Outpatient Physical Therapy Discharge Note     Patient: Yumi Bhatti  : 1980    Beginning/End Dates of Reporting Period:  10/3/2018 to 10/3/2018    Referring Provider: Louisa Gallegos PA-C    Therapy Diagnosis: vertigo     Client Self Report:  Pt was called today 10/9/2018. She did not have results from exercise given but she has seen a chiropractor and has had some impoevement. MRI done and it is negative for any issues    Goals:  Goal Identifier 1   Goal Description Pt will have 50% reduction of the DHI to indicate reduction in vertigo symptoms and to allow her to tolerate home tasks plus the trip back to [facility].   Target Date 10/12/18   Date Met      Progress:     Goal Identifier 2   Goal Description Pt will have have balance challenges improved to feel more stable on her feet.   Target Date 10/12/18   Date Met      Progress:   Progress Toward Goals:   Pt is returning to [facility] Th.She does not want further Rx from PT but she will be seeing the chiropractor again before she goes.    Plan:  Discharge from therapy.    Discharge:    Reason for Discharge: Patient has failed to schedule further appointments.    Equipment Issued: none    Discharge Plan: Patient to continue home program.

## 2018-10-22 ENCOUNTER — TELEPHONE (OUTPATIENT)
Dept: FAMILY MEDICINE | Facility: OTHER | Age: 38
End: 2018-10-22

## 2018-10-22 NOTE — LETTER
[address]   [facility] [address]  Phone: [phone]  October 22, 2018      Yumi Bhatti  [address] [facility] [address]      Dear Yumi,    We care about your health and have reviewed your health plan including your medical conditions, medications, and lab results.  Based on this review, it is recommended that you follow up regarding the following health topic(s):  -High Blood Pressure    We recommend you take the following action(s):  -schedule a FREE FLOAT MA-ONLY BLOOD PRESSURE APPOINTMENT within the next 1-4 weeks.     Please call us at the [facility] - [phone] (or use [employer]) to address the above recommendations.     Thank you for trusting [facility] and we appreciate the opportunity to serve you.  We look forward to supporting your healthcare needs in the future.    Healthy Regards,    Your Health Care Team  [facility]

## 2018-10-22 NOTE — TELEPHONE ENCOUNTER
Summary:    Patient is due/failing the following:   BP CHECK    Action needed:   Patient needs nurse only appointment.    Type of outreach:    phone number listed no longer in service. Reminder letter sent.    Questions for provider review:    None                                                                                                                                    Lien Merybrigette       Chart routed to Care Team .        Panel Management Review      Patient has the following on her problem list:     Depression / Dysthymia review    Measure:  Needs PHQ-9 score of 4 or less during index window.  Administer PHQ-9 and if score is 5 or more, send encounter to provider for next steps.        PHQ-9 SCORE 6/14/2016 7/26/2017 9/14/2018   Total Score - - -   Total Score 4 4 5       If PHQ-9 recheck is 5 or more, route to provider for next steps.    Patient is due for:  None    Hypertension   Last three blood pressure readings:  BP Readings from Last 3 Encounters:   09/28/18 (!) 130/91   09/25/18 132/90   09/14/18 (!) 142/100     Blood pressure: FAILED    HTN Guidelines:  Age 18-59 BP range:  Less than 140/90  Age 60-85 with Diabetes:  Less than 140/90  Age 60-85 without Diabetes:  less than 150/90      Composite cancer screening  Chart review shows that this patient is due/due soon for the following None

## 2019-09-27 ENCOUNTER — HEALTH MAINTENANCE LETTER (OUTPATIENT)
Age: 39
End: 2019-09-27

## 2019-10-09 ENCOUNTER — TELEPHONE (OUTPATIENT)
Dept: FAMILY MEDICINE | Facility: OTHER | Age: 39
End: 2019-10-09

## 2019-10-09 NOTE — TELEPHONE ENCOUNTER
Attempted to reach patient, phone number is not in service.   Sent reminder [employer] message.  Rica Lock MA

## 2019-10-09 NOTE — TELEPHONE ENCOUNTER
Summary:    Patient is due/failing the following:   BP CHECK    Action needed:   Recommend fasting physical, due for labs and blood pressure re-check.    Type of outreach:    Please call, send letter if needed.     Questions for provider review:    None                                                                                                                                    Louisa Gallegos PA-C

## 2019-10-26 ENCOUNTER — HEALTH MAINTENANCE LETTER (OUTPATIENT)
Age: 39
End: 2019-10-26

## 2019-12-18 ENCOUNTER — TELEPHONE (OUTPATIENT)
Dept: FAMILY MEDICINE | Facility: OTHER | Age: 39
End: 2019-12-18

## 2019-12-18 NOTE — TELEPHONE ENCOUNTER
Phone disconnected.   It susannas say that she currently lives in [facility]

## 2019-12-18 NOTE — TELEPHONE ENCOUNTER
Please call patient.  She is due for physical, pap smear, blood pressure re-check.     Louisa Gallegos PA-C

## 2020-03-15 ENCOUNTER — HEALTH MAINTENANCE LETTER (OUTPATIENT)
Age: 40
End: 2020-03-15

## 2020-05-08 ENCOUNTER — VIRTUAL VISIT (OUTPATIENT)
Dept: FAMILY MEDICINE | Facility: OTHER | Age: 40
End: 2020-05-08
Payer: COMMERCIAL

## 2020-05-08 ENCOUNTER — ANCILLARY PROCEDURE (OUTPATIENT)
Dept: ULTRASOUND IMAGING | Facility: CLINIC | Age: 40
End: 2020-05-08
Attending: PHYSICIAN ASSISTANT
Payer: COMMERCIAL

## 2020-05-08 DIAGNOSIS — R10.11 RUQ ABDOMINAL PAIN: ICD-10-CM

## 2020-05-08 DIAGNOSIS — R10.11 RUQ ABDOMINAL PAIN: Primary | ICD-10-CM

## 2020-05-08 LAB
ALBUMIN SERPL-MCNC: 4 G/DL (ref 3.4–5)
ALP SERPL-CCNC: 35 U/L (ref 40–150)
ALT SERPL W P-5'-P-CCNC: 24 U/L (ref 0–50)
ANION GAP SERPL CALCULATED.3IONS-SCNC: 6 MMOL/L (ref 3–14)
AST SERPL W P-5'-P-CCNC: 11 U/L (ref 0–45)
BILIRUB SERPL-MCNC: 1.1 MG/DL (ref 0.2–1.3)
BUN SERPL-MCNC: 18 MG/DL (ref 7–30)
CALCIUM SERPL-MCNC: 9.2 MG/DL (ref 8.5–10.1)
CHLORIDE SERPL-SCNC: 107 MMOL/L (ref 94–109)
CO2 SERPL-SCNC: 26 MMOL/L (ref 20–32)
CREAT SERPL-MCNC: 1 MG/DL (ref 0.52–1.04)
GFR SERPL CREATININE-BSD FRML MDRD: 72 ML/MIN/{1.73_M2}
GLUCOSE SERPL-MCNC: 93 MG/DL (ref 70–99)
LIPASE SERPL-CCNC: 286 U/L (ref 73–393)
POTASSIUM SERPL-SCNC: 4.4 MMOL/L (ref 3.4–5.3)
PROT SERPL-MCNC: 7.9 G/DL (ref 6.8–8.8)
SODIUM SERPL-SCNC: 139 MMOL/L (ref 133–144)

## 2020-05-08 PROCEDURE — 83516 IMMUNOASSAY NONANTIBODY: CPT | Mod: 59 | Performed by: PHYSICIAN ASSISTANT

## 2020-05-08 PROCEDURE — 83516 IMMUNOASSAY NONANTIBODY: CPT | Performed by: PHYSICIAN ASSISTANT

## 2020-05-08 PROCEDURE — 76700 US EXAM ABDOM COMPLETE: CPT

## 2020-05-08 PROCEDURE — 36415 COLL VENOUS BLD VENIPUNCTURE: CPT | Performed by: PHYSICIAN ASSISTANT

## 2020-05-08 PROCEDURE — 80053 COMPREHEN METABOLIC PANEL: CPT | Performed by: PHYSICIAN ASSISTANT

## 2020-05-08 PROCEDURE — 83690 ASSAY OF LIPASE: CPT | Performed by: PHYSICIAN ASSISTANT

## 2020-05-08 PROCEDURE — 99214 OFFICE O/P EST MOD 30 MIN: CPT | Mod: 95 | Performed by: PHYSICIAN ASSISTANT

## 2020-05-08 RX ORDER — OMEPRAZOLE 40 MG/1
40 CAPSULE, DELAYED RELEASE ORAL DAILY
Qty: 90 CAPSULE | Refills: 1 | Status: SHIPPED | OUTPATIENT
Start: 2020-05-08 | End: 2020-07-30

## 2020-05-08 ASSESSMENT — ANXIETY QUESTIONNAIRES
6. BECOMING EASILY ANNOYED OR IRRITABLE: NOT AT ALL
3. WORRYING TOO MUCH ABOUT DIFFERENT THINGS: NOT AT ALL
7. FEELING AFRAID AS IF SOMETHING AWFUL MIGHT HAPPEN: NOT AT ALL
1. FEELING NERVOUS, ANXIOUS, OR ON EDGE: NOT AT ALL
IF YOU CHECKED OFF ANY PROBLEMS ON THIS QUESTIONNAIRE, HOW DIFFICULT HAVE THESE PROBLEMS MADE IT FOR YOU TO DO YOUR WORK, TAKE CARE OF THINGS AT HOME, OR GET ALONG WITH OTHER PEOPLE: NOT DIFFICULT AT ALL
2. NOT BEING ABLE TO STOP OR CONTROL WORRYING: NOT AT ALL
5. BEING SO RESTLESS THAT IT IS HARD TO SIT STILL: NOT AT ALL
GAD7 TOTAL SCORE: 0

## 2020-05-08 ASSESSMENT — PATIENT HEALTH QUESTIONNAIRE - PHQ9
5. POOR APPETITE OR OVEREATING: NOT AT ALL
SUM OF ALL RESPONSES TO PHQ QUESTIONS 1-9: 0

## 2020-05-08 ASSESSMENT — PAIN SCALES - GENERAL: PAINLEVEL: MODERATE PAIN (4)

## 2020-05-08 NOTE — PROGRESS NOTES
"Yumi Bhatti is a 39 year old female who is being evaluated via a billable video visit.      The patient has been notified of following:     \"This video visit will be conducted via a call between you and your physician/provider. We have found that certain health care needs can be provided without the need for an in-person physical exam.  This service lets us provide the care you need with a video conversation.  If a prescription is necessary we can send it directly to your pharmacy.  If lab work is needed we can place an order for that and you can then stop by our lab to have the test done at a later time.    Video visits are billed at different rates depending on your insurance coverage.  Please reach out to your insurance provider with any questions.    If during the course of the call the physician/provider feels a video visit is not appropriate, you will not be charged for this service.\"    Patient has given verbal consent for Video visit? Yes    How would you like to obtain your AVS? Rajesh    Patient would like the video invitation sent by: Text to cell phone: [phone]    Will anyone else be joining your video visit? No    Subjective     Yumi Bhatti is a 39 year old female who presents to clinic today for the following health issues:    HPI  ABDOMINAL   PAIN     Onset: on and off for a long time-woke up with pain yesterday morning and last night was really bad    Description:   Character: Sharp, Stabbing and Cramping  Location: right side and can travel up side to esophagus   Radiation: None    Intensity: moderate, severe    Progression of Symptoms:  intermittent    Accompanying Signs & Symptoms:heartburn like  Fever/Chills?: no   Gas/Bloating: YES   Nausea: no   Vomitting: no   Diarrhea?: YES - Have always had loose bowels, different smell and look to it today and yesterday but has been traveling.   Constipation:no   Dysuria or Hematuria: no   Heartburn: YES  Blood in stool: NO  Tarry " "Stools: NO   History: traveled recently to [facility] for 3 months   Trauma: no   Previous similar pain: none  Previous tests done: none  Abdominal surgeries: Tubal pregnancy with fallopian tube removed, still has both ovaries. On the side she had tube removed she still has a lot of discomfort during her period on that side.  Will have her period completely and then will   New medications: NO  New OTC products: NO  Diet Changes: YES - Keto on and off.     Precipitating factors:   Does the pain change with:     Food: YES- eating makes it works-eats Keto is much better, spicy foods      BM: YES- sometimes better but sometimes not    Urination: no     Alleviating factors:  Nope     Therapies Tried and outcome: nothing     LMP:  4/17/2020   - Symptoms first started - feels like forever she has had intense severe pain, bloated based on what she is eating.    - Did Keto 1.5 years ago and lost 85 lbs.  If she eats strictly keto it is much better.  If any carbs or sugars then she'll be bloated and achy.    - Last night's episode was something new - stomach was in a weird pain since she woke up yesterday morning, had a bowel movement thinking it would improve.  Didn't want to eat because it would hurt more if she ate.  At around 7pm she had  Eggs and a salad for dinner, but she started to have \"intense heartburn\" which is not normal for her and then it started to hurt on the right side rib cage and radiated up her esophagus.  Lasted about 5-6 hours and then stomach hurt all night long.  Still hurting today.  Still having some burning but not like it was.     [facility] 3 months, [place] 2 weeks, [place]    Video Start Time: Start: 05/08/2020 09:48 am     Current Outpatient Medications   Medication Sig Dispense Refill     lisinopril (PRINIVIL/ZESTRIL) 40 MG tablet Take 1 tablet (40 mg) by mouth daily (Patient taking differently: Take 10 mg by mouth daily ) 90 tablet 1     omeprazole (PRILOSEC) 40 MG DR capsule Take 1 " "capsule (40 mg) by mouth daily 90 capsule 1     MOTRIN IB PO Take 600 mg by mouth every 6 hours as needed for moderate pain       Naproxen Sodium (ALEVE PO) Take 550 mg by mouth 2 times daily as needed for moderate pain       Allergies   Allergen Reactions     Macrobid [Nitrofuran Derivatives] GI Disturbance     Latex Rash       Reviewed and updated as needed this visit by Provider         Review of Systems   Constitutional, HEENT, cardiovascular, pulmonary, gi and gu systems are negative, except as otherwise noted.      Objective    LMP 04/17/2020 (Approximate)   Estimated body mass index is 39.52 kg/m  as calculated from the following:    Height as of 9/25/18: 1.67 m (5' 5.75\").    Weight as of 9/25/18: 110.2 kg (243 lb).  Physical Exam     GENERAL: Healthy, alert and no distress  EYES: Eyes grossly normal to inspection.  No discharge or erythema, or obvious scleral/conjunctival abnormalities.  HENT: normal cephalic/atraumatic  RESP: no audible wheeze, cough, or visible cyanosis.  No visible retractions or increased work of breathing.  Able to speak fully in complete sentences.  NEURO: Cranial nerves grossly intact, mentation intact and speech normal  PSYCH: mentation appears normal, affect normal/bright, judgement and insight intact, normal speech and appearance well-groomed      Diagnostic Test Results:  Labs reviewed in Epic  Results for orders placed or performed in visit on 05/08/20   US Abdomen Complete     Status: None    Narrative    ULTRASOUND ABDOMEN COMPLETE   5/8/2020 3:38 PM     HISTORY: Right upper quadrant abdominal pain.    COMPARISON: None.    FINDINGS:    Gallbladder: Normal with no cholelithiasis, wall thickening or focal  tenderness.      Bile ducts: CHD is normal diameter. No intrahepatic biliary  dilatation.    Liver: Fatty liver suggested.    Pancreas: Partially obscured by overlying bowel gas, but grossly  unremarkable.     Spleen: Normal.     Right kidney: Renal length of 10.9 cm. Cortical " thickness of 0.8 cm.  Echogenic appearance of the renal pyramids. No hydronephrosis.    Left kidney: Renal length of 11.4 cm. Cortical thickness of 1 cm.  Echogenic appearance of the renal pyramids. No hydronephrosis.    Aorta and IVC: Not specifically assessed.      Impression    IMPRESSION:    1. Fatty liver.  2. Bilateral kidneys are symmetric but show cortical thinning and  somewhat echogenic renal pyramids. This could potentially be seen with  medullary sponge kidney. Correlate with any history of medical renal  disease.    YUKI MONTALVO MD   Results for orders placed or performed in visit on 05/08/20   Comprehensive metabolic panel     Status: Abnormal   Result Value Ref Range    Sodium 139 133 - 144 mmol/L    Potassium 4.4 3.4 - 5.3 mmol/L    Chloride 107 94 - 109 mmol/L    Carbon Dioxide 26 20 - 32 mmol/L    Anion Gap 6 3 - 14 mmol/L    Glucose 93 70 - 99 mg/dL    Urea Nitrogen 18 7 - 30 mg/dL    Creatinine 1.00 0.52 - 1.04 mg/dL    GFR Estimate 72 >60 mL/min/[1.73_m2]    GFR Estimate If Black 83 >60 mL/min/[1.73_m2]    Calcium 9.2 8.5 - 10.1 mg/dL    Bilirubin Total 1.1 0.2 - 1.3 mg/dL    Albumin 4.0 3.4 - 5.0 g/dL    Protein Total 7.9 6.8 - 8.8 g/dL    Alkaline Phosphatase 35 (L) 40 - 150 U/L    ALT 24 0 - 50 U/L    AST 11 0 - 45 U/L   Lipase     Status: None   Result Value Ref Range    Lipase 286 73 - 393 U/L           Assessment & Plan       ICD-10-CM    1. RUQ abdominal pain  R10.11 US Abdomen Complete     Comprehensive metabolic panel     Lipase     Helicobacter pylori Antigen Stool     omeprazole (PRILOSEC) 40 MG DR capsule     Tissue transglutaminase melody IgA and IgG          Differential diagnosis includes but not limited to cholelithiasis, cholangitis, gastritis, GERD, ulcer, H. Pylori infection, inflammatory bowel disease, irritable bowel syndrome, Celiac.      At this time she has no red flag symptoms. She appears stable on the video. Did recommend labs and ultrasound.  No concerns on either.  Waiting on Celiac testing.  Recommended starting acid reducer in the meantime. Imaging and labs were normal ruling out immediate gallbladder concerns.  Consider GERD, ulcer H pylori infection and waiting on lab for H. Pylori, started her on Omeprazole and follow-up.  Consider scopes to further evaluate or referral to GI.     Advised ED if severe pain, high fevers, etc.  Of note patient did go to the ED and had labs and imaging done.  There was no specific findings other than concerns for potential urinary tract infection, she was started on Ciprofloxacin.  We will notify her when Celiac testing is done. Consider endoscopy as well if symptoms persist.     Options for treatment and follow-up care were reviewed with the patient and/or guardian. Patient and/or guardian engaged in the decision making process and verbalized understanding of the options discussed and agreed with the final plan.     Return in about 5 days (around 5/13/2020) for If not improving, sooner if worse or new concerns.     Options for treatment and follow-up care were reviewed with the patient and/or guardian. Patient and/or guardian engaged in the decision making process and verbalized understanding of the options discussed and agreed with the final plan.     Louisa Gallegos PA-C  [facility]      Video-Visit Details    Type of service:  Video Visit    Video End Time:10:04 AM    Originating Location (pt. Location): Home    Distant Location (provider location):  [facility]     Platform used for Video Visit: AmWell    Return in about 5 days (around 5/13/2020) for If not improving, sooner if worse or new concerns.       Louisa Gallegos PA-C

## 2020-05-09 ASSESSMENT — ANXIETY QUESTIONNAIRES: GAD7 TOTAL SCORE: 0

## 2020-05-10 ENCOUNTER — MYC MEDICAL ADVICE (OUTPATIENT)
Dept: FAMILY MEDICINE | Facility: OTHER | Age: 40
End: 2020-05-10

## 2020-05-11 ENCOUNTER — APPOINTMENT (OUTPATIENT)
Dept: CT IMAGING | Facility: CLINIC | Age: 40
End: 2020-05-11
Attending: FAMILY MEDICINE
Payer: COMMERCIAL

## 2020-05-11 ENCOUNTER — TELEPHONE (OUTPATIENT)
Dept: FAMILY MEDICINE | Facility: OTHER | Age: 40
End: 2020-05-11

## 2020-05-11 ENCOUNTER — HOSPITAL ENCOUNTER (EMERGENCY)
Facility: CLINIC | Age: 40
Discharge: HOME | End: 2020-05-11
Attending: FAMILY MEDICINE | Admitting: FAMILY MEDICINE
Payer: COMMERCIAL

## 2020-05-11 ENCOUNTER — MYC MEDICAL ADVICE (OUTPATIENT)
Dept: FAMILY MEDICINE | Facility: OTHER | Age: 40
End: 2020-05-11

## 2020-05-11 VITALS
BODY MASS INDEX: 29.27 KG/M2 | SYSTOLIC BLOOD PRESSURE: 118 MMHG | RESPIRATION RATE: 16 BRPM | WEIGHT: 180 LBS | OXYGEN SATURATION: 99 % | DIASTOLIC BLOOD PRESSURE: 78 MMHG | TEMPERATURE: 97.9 F

## 2020-05-11 DIAGNOSIS — R19.7 DIARRHEA, UNSPECIFIED TYPE: ICD-10-CM

## 2020-05-11 DIAGNOSIS — R10.84 ABDOMINAL PAIN, GENERALIZED: ICD-10-CM

## 2020-05-11 DIAGNOSIS — R10.11 RUQ ABDOMINAL PAIN: Primary | ICD-10-CM

## 2020-05-11 LAB
ALBUMIN SERPL-MCNC: 3.8 G/DL (ref 3.4–5)
ALBUMIN UR-MCNC: NEGATIVE MG/DL
ALP SERPL-CCNC: 34 U/L (ref 40–150)
ALT SERPL W P-5'-P-CCNC: 19 U/L (ref 0–50)
ANION GAP SERPL CALCULATED.3IONS-SCNC: 5 MMOL/L (ref 3–14)
APPEARANCE UR: ABNORMAL
AST SERPL W P-5'-P-CCNC: 9 U/L (ref 0–45)
BASOPHILS # BLD AUTO: 0 10E9/L (ref 0–0.2)
BASOPHILS NFR BLD AUTO: 0.6 %
BILIRUB SERPL-MCNC: 1.3 MG/DL (ref 0.2–1.3)
BILIRUB UR QL STRIP: NEGATIVE
BUN SERPL-MCNC: 16 MG/DL (ref 7–30)
CALCIUM SERPL-MCNC: 8.5 MG/DL (ref 8.5–10.1)
CHLORIDE SERPL-SCNC: 108 MMOL/L (ref 94–109)
CO2 SERPL-SCNC: 26 MMOL/L (ref 20–32)
COLOR UR AUTO: YELLOW
CREAT SERPL-MCNC: 0.93 MG/DL (ref 0.52–1.04)
DIFFERENTIAL METHOD BLD: NORMAL
EOSINOPHIL NFR BLD AUTO: 12.1 %
ERYTHROCYTE [DISTWIDTH] IN BLOOD BY AUTOMATED COUNT: 12.7 % (ref 10–15)
GFR SERPL CREATININE-BSD FRML MDRD: 77 ML/MIN/{1.73_M2}
GLUCOSE SERPL-MCNC: 91 MG/DL (ref 70–99)
GLUCOSE UR STRIP-MCNC: NEGATIVE MG/DL
HCG UR QL: NEGATIVE
HCT VFR BLD AUTO: 43.8 % (ref 35–47)
HGB BLD-MCNC: 14.5 G/DL (ref 11.7–15.7)
HGB UR QL STRIP: NEGATIVE
HYALINE CASTS #/AREA URNS LPF: 1 /LPF (ref 0–2)
IMM GRANULOCYTES # BLD: 0 10E9/L (ref 0–0.4)
IMM GRANULOCYTES NFR BLD: 0.2 %
KETONES UR STRIP-MCNC: 20 MG/DL
LEUKOCYTE ESTERASE UR QL STRIP: ABNORMAL
LIPASE SERPL-CCNC: 242 U/L (ref 73–393)
LYMPHOCYTES # BLD AUTO: 2 10E9/L (ref 0.8–5.3)
LYMPHOCYTES NFR BLD AUTO: 29.4 %
MCH RBC QN AUTO: 31.7 PG (ref 26.5–33)
MCHC RBC AUTO-ENTMCNC: 33.1 G/DL (ref 31.5–36.5)
MCV RBC AUTO: 96 FL (ref 78–100)
MONOCYTES # BLD AUTO: 0.5 10E9/L (ref 0–1.3)
MONOCYTES NFR BLD AUTO: 7.1 %
MUCOUS THREADS #/AREA URNS LPF: PRESENT /LPF
NEUTROPHILS # BLD AUTO: 3.4 10E9/L (ref 1.6–8.3)
NEUTROPHILS NFR BLD AUTO: 50.6 %
NITRATE UR QL: NEGATIVE
NRBC # BLD AUTO: 0 10*3/UL
NRBC BLD AUTO-RTO: 0 /100
PH UR STRIP: 5 PH (ref 5–7)
PLATELET # BLD AUTO: 168 10E9/L (ref 150–450)
POTASSIUM SERPL-SCNC: 3.9 MMOL/L (ref 3.4–5.3)
PROT SERPL-MCNC: 7.7 G/DL (ref 6.8–8.8)
RBC # BLD AUTO: 4.57 10E12/L (ref 3.8–5.2)
RBC #/AREA URNS AUTO: 1 /HPF (ref 0–2)
SODIUM SERPL-SCNC: 139 MMOL/L (ref 133–144)
SOURCE: ABNORMAL
SP GR UR STRIP: 1.03 (ref 1–1.03)
SQUAMOUS #/AREA URNS AUTO: 7 /HPF (ref 0–1)
TTG IGA SER-ACNC: 1 U/ML
TTG IGG SER-ACNC: 1 U/ML
UROBILINOGEN UR STRIP-MCNC: 0 MG/DL (ref 0–2)
WBC # BLD AUTO: 6.6 10E9/L (ref 4–11)
WBC #/AREA URNS AUTO: 12 /HPF (ref 0–5)

## 2020-05-11 PROCEDURE — 96376 TX/PRO/DX INJ SAME DRUG ADON: CPT | Performed by: FAMILY MEDICINE

## 2020-05-11 PROCEDURE — 99285 EMERGENCY DEPT VISIT HI MDM: CPT | Mod: Z6 | Performed by: FAMILY MEDICINE

## 2020-05-11 PROCEDURE — 81025 URINE PREGNANCY TEST: CPT | Performed by: FAMILY MEDICINE

## 2020-05-11 PROCEDURE — 81001 URINALYSIS AUTO W/SCOPE: CPT | Performed by: FAMILY MEDICINE

## 2020-05-11 PROCEDURE — 80053 COMPREHEN METABOLIC PANEL: CPT | Performed by: FAMILY MEDICINE

## 2020-05-11 PROCEDURE — 96374 THER/PROPH/DIAG INJ IV PUSH: CPT | Performed by: FAMILY MEDICINE

## 2020-05-11 PROCEDURE — 99285 EMERGENCY DEPT VISIT HI MDM: CPT | Mod: 25 | Performed by: FAMILY MEDICINE

## 2020-05-11 PROCEDURE — 87086 URINE CULTURE/COLONY COUNT: CPT | Performed by: FAMILY MEDICINE

## 2020-05-11 PROCEDURE — 83690 ASSAY OF LIPASE: CPT | Performed by: FAMILY MEDICINE

## 2020-05-11 PROCEDURE — 25000128 H RX IP 250 OP 636: Performed by: FAMILY MEDICINE

## 2020-05-11 PROCEDURE — 74176 CT ABD & PELVIS W/O CONTRAST: CPT

## 2020-05-11 PROCEDURE — 96375 TX/PRO/DX INJ NEW DRUG ADDON: CPT | Performed by: FAMILY MEDICINE

## 2020-05-11 PROCEDURE — 85025 COMPLETE CBC W/AUTO DIFF WBC: CPT | Performed by: FAMILY MEDICINE

## 2020-05-11 PROCEDURE — 25800030 ZZH RX IP 258 OP 636: Performed by: FAMILY MEDICINE

## 2020-05-11 PROCEDURE — 96361 HYDRATE IV INFUSION ADD-ON: CPT | Performed by: FAMILY MEDICINE

## 2020-05-11 PROCEDURE — 25000132 ZZH RX MED GY IP 250 OP 250 PS 637: Performed by: FAMILY MEDICINE

## 2020-05-11 RX ORDER — OXYCODONE AND ACETAMINOPHEN 5; 325 MG/1; MG/1
1-2 TABLET ORAL EVERY 6 HOURS PRN
Qty: 15 TABLET | Refills: 0 | Status: SHIPPED | OUTPATIENT
Start: 2020-05-11 | End: 2020-07-30

## 2020-05-11 RX ORDER — HYDROMORPHONE HYDROCHLORIDE 1 MG/ML
0.5 INJECTION, SOLUTION INTRAMUSCULAR; INTRAVENOUS; SUBCUTANEOUS
Status: DISCONTINUED | OUTPATIENT
Start: 2020-05-11 | End: 2020-05-11 | Stop reason: HOSPADM

## 2020-05-11 RX ORDER — CIPROFLOXACIN 500 MG/1
500 TABLET, FILM COATED ORAL 2 TIMES DAILY
Qty: 14 TABLET | Refills: 0 | Status: SHIPPED | OUTPATIENT
Start: 2020-05-11 | End: 2020-07-29

## 2020-05-11 RX ORDER — ONDANSETRON 2 MG/ML
4 INJECTION INTRAMUSCULAR; INTRAVENOUS ONCE
Status: DISCONTINUED | OUTPATIENT
Start: 2020-05-11 | End: 2020-05-11 | Stop reason: HOSPADM

## 2020-05-11 RX ORDER — ONDANSETRON 2 MG/ML
4 INJECTION INTRAMUSCULAR; INTRAVENOUS
Status: COMPLETED | OUTPATIENT
Start: 2020-05-11 | End: 2020-05-11

## 2020-05-11 RX ORDER — PHENAZOPYRIDINE HYDROCHLORIDE 100 MG/1
100 TABLET, FILM COATED ORAL 3 TIMES DAILY PRN
Qty: 9 TABLET | Refills: 0 | Status: SHIPPED | OUTPATIENT
Start: 2020-05-11 | End: 2020-07-30

## 2020-05-11 RX ORDER — PHENAZOPYRIDINE HYDROCHLORIDE 100 MG/1
200 TABLET, FILM COATED ORAL ONCE
Status: COMPLETED | OUTPATIENT
Start: 2020-05-11 | End: 2020-05-11

## 2020-05-11 RX ADMIN — HYDROMORPHONE HYDROCHLORIDE 0.5 MG: 1 INJECTION, SOLUTION INTRAMUSCULAR; INTRAVENOUS; SUBCUTANEOUS at 05:57

## 2020-05-11 RX ADMIN — ONDANSETRON 4 MG: 2 INJECTION INTRAMUSCULAR; INTRAVENOUS at 04:19

## 2020-05-11 RX ADMIN — HYDROMORPHONE HYDROCHLORIDE 0.5 MG: 1 INJECTION, SOLUTION INTRAMUSCULAR; INTRAVENOUS; SUBCUTANEOUS at 04:56

## 2020-05-11 RX ADMIN — SODIUM CHLORIDE 1000 ML: 9 INJECTION, SOLUTION INTRAVENOUS at 04:19

## 2020-05-11 RX ADMIN — PHENAZOPYRIDINE HYDROCHLORIDE 200 MG: 100 TABLET ORAL at 06:17

## 2020-05-11 NOTE — TELEPHONE ENCOUNTER
LM for patient to return phone call to clinic about message below.  Gail Ramirez CMA ([facility])    ----- Message from Louisa Gallegos PA-C sent at 5/11/2020 11:21 AM CDT -----  Please call pino.  Her Celiac test was normal.  See how she is feeling, saw that she went to the ER.  Also recommend if possible H Pylori test.     Louisa Gallegos PA-C

## 2020-05-11 NOTE — ED PROVIDER NOTES
[facility] ED Provider Note   Patient: Yumi Bhatti  MRN #:  [id number]  Date of Visit: May 11, 2020    CC:     Chief Complaint   Patient presents with     Abdominal Pain     HPI:  Yumi Bhatti is a 39 year old female who presented to the emergency department with increasing abdominal pain and diarrhea.  Patient reports onset of right upper quadrant pain Thursday night into Friday morning.  She had severe pain that lasted for 5-6 hours.  At one point the pain was shooting up into the chest and causing some burning pain.  She went to see her primary care provider on Friday through an electronic virtual visit, had some blood work done as well as an right upper quadrant ultrasound.  Ultrasound findings revealed fatty liver, bilateral kidneys are symmetric but show cortical thinning and somewhat echogenic renal pyramids.  This could be seen with medullary sponge kidney.  Patient reports that that pain has persisted, and now over the weekend she has more bloating, and new pain that is just below the umbilicus that is sharp and stabbing.  Pain level ranges between 6-9/10.  She started to feel nauseated yesterday, and has had 3 episodes of diarrhea since then.  She feels gurgly, with a sour feeling in her stomach, and has not been able to eat much over the last few days.  Patient reports that over the last year she has lost 85 pounds by initially going to a keto diet and now a low-carb diet.  She has noticed that her pain is exacerbated by eating and drinking.  She has a TTG IgA and IgG antibody test pending to screen for celiac disease.  There has been thought about the possibility of helical back to pylori testing as well.  Patient has never had an endoscopy.  She denies history of heartburn or GERD.  Patient states that she had been living in [facility] for a number of years, and recently moved back.  She was in [place] for 3 months and just  returned about a week ago.  Past medical history is pertinent for hypothyroidism, hypertension, migraine headaches, and chronic kidney disease stage II.  Past surgeries include a laparoscopic surgery for tubal pregnancy and 4  sections.  Patient denies tobacco or alcohol use.    Problem List:  Patient Active Problem List    Diagnosis Date Noted     Obesity (BMI 35.0-39.9) with comorbidity (H) 2018     Priority: Medium     Essential hypertension with goal blood pressure less than 140/90 2016     Priority: Medium     Hypothyroidism, unspecified type 2016     Priority: Medium     Major depression in complete remission (H) 2013     Priority: Medium     Migraine 12/10/2012     Priority: Medium     Coccydynia 12/10/2012     Priority: Medium     Hypertension goal BP (blood pressure) < 140/90 2012     Priority: Medium     At BP goal at RN HTN visit 12       CKD (chronic kidney disease) stage 2, GFR 60-89 ml/min 2012     Priority: Medium     Bulge of lumbar disc without myelopathy 2012     Priority: Medium     L4/5 disc bulge without foraminal stenosis, last MRI 3/12/12       Health Care Home 2011     Priority: Medium     DX V65.8 REPLACED WITH 01980 [facility]ALTH CARE HOME (2013)       CARDIOVASCULAR SCREENING; LDL GOAL LESS THAN 160 10/31/2010     Priority: Medium     Insomnia 2006     Priority: Medium     Problem list name updated by automated process. Provider to review       Hemorrhoids 2006     Priority: Medium     Problem list name updated by automated process. Provider to review       Sacroiliitis, not elsewhere classified (H) 2005     Priority: Medium     Obesity 2005     Priority: Medium     Problem list name updated by automated process. Provider to review         Past Medical History:   Diagnosis Date     Depressive disorder, not elsewhere classified      Essential hypertension with goal blood pressure less than 140/90  2016     Hypertension      Hypothyroidism, unspecified type 2016     Migraines      Other complication of obstetrical surgical wounds, postpartum condition or complication 06/28/10     Pelvic peritoneal adhesions, female (postoperative) (postinfection)      Previous  delivery, delivered, with or without mention of antepartum condition 06/17/10     Unspecified septicemia(038.9) (H)        MEDS: ciprofloxacin (CIPRO) 500 MG tablet  oxyCODONE-acetaminophen (PERCOCET) 5-325 MG tablet  phenazopyridine (PYRIDIUM) 100 MG tablet  lisinopril (PRINIVIL/ZESTRIL) 40 MG tablet  MOTRIN IB PO  Naproxen Sodium (ALEVE PO)  omeprazole (PRILOSEC) 40 MG DR capsule        ALLERGIES:    Allergies   Allergen Reactions     Macrobid [Nitrofuran Derivatives] GI Disturbance     Latex Rash       Past Surgical History:   Procedure Laterality Date     C/SECTION, LOW TRANSVERSE  2006     C/SECTION, LOW TRANSVERSE  2007    , Low Transverse     C/SECTION, LOW TRANSVERSE  06/17/10    , Low Transverse     C/SECTION, LOW TRANSVERSE  13    ERCS with TL     HC TOOTH EXTRACTION W/FORCEP      age 22.     LAPAROSCOPIC SALPINGECTOMY  3/1/2012    Procedure:LAPAROSCOPIC SALPINGECTOMY; Laparoscopic Left Salpingectomy; Surgeon:LUIS GARCIA; Location:[facility] OR     TUBAL LIGATION  14     TUBAL/ECTOPIC PREGNANCY         Social History     Tobacco Use     Smoking status: Never Smoker     Smokeless tobacco: Never Used     Tobacco comment: no smokers in the household   Substance Use Topics     Alcohol use: Yes     Comment: once or twice a month     Drug use: No         Review of Systems   Except as noted in HPI, all other systems were reviewed and are negative    Physical Exam     Vitals were reviewed  Patient Vitals for the past 12 hrs:   BP Temp Temp src Heart Rate SpO2 Weight   20 0554 -- -- -- -- 100 % --   20 0411 (!) 145/90 97.9  F (36.6  C) Oral 65 99 % 81.6 kg (180 lb)     GENERAL  APPEARANCE: Alert and oriented x3; moderate distress due to pain  FACE: normal facies  EYES: Pupils are equal; sclerae nonicteric  HENT: normal external exam  NECK: no adenopathy or asymmetry  RESP: normal respiratory effort; clear breath sounds bilaterally  CV: regular rate and rhythm; no significant murmurs, gallops or rubs  ABD: soft, diffuse abdominal tenderness; no rebound or guarding; bowel sounds are normal  MS: no gross deformities noted; normal muscle tone.  EXT: No calf tenderness or pitting edema  SKIN: no worrisome rash  NEURO: no facial droop; no focal deficits, speech is normal  PSYCH: normal mood and affect      Available Lab/Imaging Results     Results for orders placed or performed during the hospital encounter of 05/11/20 (from the past 24 hour(s))   CBC with platelets differential   Result Value Ref Range    WBC 6.6 4.0 - 11.0 10e9/L    RBC Count 4.57 3.8 - 5.2 10e12/L    Hemoglobin 14.5 11.7 - 15.7 g/dL    Hematocrit 43.8 35.0 - 47.0 %    MCV 96 78 - 100 fl    MCH 31.7 26.5 - 33.0 pg    MCHC 33.1 31.5 - 36.5 g/dL    RDW 12.7 10.0 - 15.0 %    Platelet Count 168 150 - 450 10e9/L    Diff Method Automated Method     % Neutrophils 50.6 %    % Lymphocytes 29.4 %    % Monocytes 7.1 %    % Eosinophils 12.1 %    % Basophils 0.6 %    % Immature Granulocytes 0.2 %    Nucleated RBCs 0 0 /100    Absolute Neutrophil 3.4 1.6 - 8.3 10e9/L    Absolute Lymphocytes 2.0 0.8 - 5.3 10e9/L    Absolute Monocytes 0.5 0.0 - 1.3 10e9/L    Absolute Basophils 0.0 0.0 - 0.2 10e9/L    Abs Immature Granulocytes 0.0 0 - 0.4 10e9/L    Absolute Nucleated RBC 0.0    Comprehensive metabolic panel   Result Value Ref Range    Sodium 139 133 - 144 mmol/L    Potassium 3.9 3.4 - 5.3 mmol/L    Chloride 108 94 - 109 mmol/L    Carbon Dioxide 26 20 - 32 mmol/L    Anion Gap 5 3 - 14 mmol/L    Glucose 91 70 - 99 mg/dL    Urea Nitrogen 16 7 - 30 mg/dL    Creatinine 0.93 0.52 - 1.04 mg/dL    GFR Estimate 77 >60 mL/min/[1.73_m2]    GFR Estimate  If Black 90 >60 mL/min/[1.73_m2]    Calcium 8.5 8.5 - 10.1 mg/dL    Bilirubin Total 1.3 0.2 - 1.3 mg/dL    Albumin 3.8 3.4 - 5.0 g/dL    Protein Total 7.7 6.8 - 8.8 g/dL    Alkaline Phosphatase 34 (L) 40 - 150 U/L    ALT 19 0 - 50 U/L    AST 9 0 - 45 U/L   Lipase   Result Value Ref Range    Lipase 242 73 - 393 U/L   Abd/pelvis CT - no contrast - Stone Protocol    Narrative    EXAM: CT ABDOMEN PELVIS W/O CONTRAST  LOCATION: [facility]  DATE/TIME: 5/11/2020 5:05 AM    INDICATION: Abd pain, acute, generalized. Diarrhea. Right upper quadrant pain.  COMPARISON: None.  TECHNIQUE: CT scan of the abdomen and pelvis was performed without IV contrast. Multiplanar reformats were obtained. Dose reduction techniques were used.  CONTRAST: None.    FINDINGS:   LOWER CHEST: Minimal amount of bilateral dependent atelectasis left greater than the right.    HEPATOBILIARY: Unremarkable.    PANCREAS: Unremarkable.    SPLEEN: Normal in size.    ADRENAL GLANDS: No adrenal nodule.    KIDNEYS/BLADDER: No hydronephrosis. No hydroureter. No distal obstructing ureteral calculi. Bladder partially filled. Minimal fat stranding adjacent to the bladder nonspecific.    BOWEL: Stomach partially filled with gastric contents. Appendix normal in caliber. No focal inflammatory change. No evidence of obstruction.    LYMPH NODES: Right inguinal hernia measures 3.2 x 1.7 cm with lobulated soft tissue.    VASCULATURE: Unremarkable.    PELVIC ORGANS: Uterus mildly enlarged. Phleboliths in the pelvis.    MUSCULOSKELETAL: Normal.      Impression    IMPRESSION:   1.  No CT evidence of appendicitis.  2.  No distal obstructing ureteral calculi.  3.  Recommend correlation to exclude inflammatory process due to cystitis, not definite and bladder not well distended.  4.  Right inguinal hernia contains soft tissue density which may represent nonspecific enlarged lymph node.     Routine UA with microscopic   Result Value Ref Range    Color Urine Yellow      Appearance Urine Slightly Cloudy     Glucose Urine Negative NEG^Negative mg/dL    Bilirubin Urine Negative NEG^Negative    Ketones Urine 20 (A) NEG^Negative mg/dL    Specific Gravity Urine 1.026 1.003 - 1.035    Blood Urine Negative NEG^Negative    pH Urine 5.0 5.0 - 7.0 pH    Protein Albumin Urine Negative NEG^Negative mg/dL    Urobilinogen mg/dL 0.0 0.0 - 2.0 mg/dL    Nitrite Urine Negative NEG^Negative    Leukocyte Esterase Urine Small (A) NEG^Negative    Source Unspecified Urine     WBC Urine 12 (H) 0 - 5 /HPF    RBC Urine 1 0 - 2 /HPF    Squamous Epithelial /HPF Urine 7 (H) 0 - 1 /HPF    Mucous Urine Present (A) NEG^Negative /LPF    Hyaline Casts 1 0 - 2 /LPF   HCG qualitative urine   Result Value Ref Range    HCG Qual Urine Negative NEG^Negative            Impression     Final diagnoses:   Abdominal pain, generalized   Diarrhea         ED Course & Medical Decision Making   Yumi Bhatti is a 39 year old female who presented to the emergency department with 3 separate abdominal pains associated with severe bloating, and inability to eat.  She has had a right upper quadrant abdominal pain since last Thursday, and this was worked up on Friday with blood work and ultrasound.  She continued to have some difficulty tolerating any foods or liquids and states that she has hardly eaten except for a few crackers.  She developed a new pain just behind and below the umbilicus that has been sharp.  She has severe bloating associated with this.  She did notice in the emergency department that the pain in the lower abdominal area behind the bellybutton was aggravated by urination.  She does not have typical bladder infection symptoms however.  There has been no fever, chills, vomiting but she has had nausea and diarrhea approximately 3 in the last day.  Vital signs reveal a temp of 97.9 blood pressure 145/90, heart rate of 65.  Exam reveals diffuse upper abdominal tenderness, generalized abdominal distention, and  exquisite tenderness over the suprapubic area just below the umbilicus.  Patient could barely tolerate some pressure over the abdominal wall of the umbilicus raising the possibility of a hernia.  CT scan of the abdomen reveals that the stomach is partially filled with gastric contents.  Appendix is normal in caliber.  There is no focal inflammatory change.  No evidence of obstruction.  There is a right inguinal hernia measuring 3.2 x 1.7 cm with lobulated soft tissue.  Kidneys and bladder revealed no hydronephrosis, no hydroureter, no distal obstructing ureteral calculi.  Bladder is partially filled.  Minimal fat stranding adjacent to the bladder which is nonspecific.  Urinalysis revealed 12 white blood cells, 1 red blood cell, 7 squamous epithelial cell.  Urine culture is pending.  The patient's symptoms are difficult to piece together into a single diagnosis.  The stomach is partially filled with gastric contents even though she has not been eating or drinking much.  This raises the possibility of gastroparesis.  She also has some minimal fat stranding adjacent to the bladder which may be from a bladder infection.  Urine culture is pending but I will have the patient begin Cipro 500 mg twice a day for 7 days.  She was given Pyridium in the emergency department to see if it would help with her retro-umbilical pain.  Patient was instructed to follow-up with her primary care provider within the next few days.  Discuss a referral to gastroenterology for an upper endoscopy and possibly colonoscopy.  Follow-up on urine culture.  Patient was advised to return to the emergency department at any time if symptoms worsen.         Written after-visit summary and instructions were given at the time of discharge.    Follow up Plan:   Louisa Gallegos PA-C  [address] [place] [id number]  [phone]    Schedule an appointment as soon as possible for a visit   as soon as possible.  Discuss scheduling an upper endoscopy and  possibly colonoscopy at the same time.    [facility] Emergency Department  [address] [place] [id number] [phone]    If symptoms worsen      Discharge Medications: Cipro, Pyridium, Percocet         Disclaimer: This note consists of words and symbols derived from keyboarding and dictation using voice recognition software.  As a result, there may be errors that have gone undetected.  Please consider this when interpreting information found in this note.       Roxie Beckman MD  05/11/20 0702

## 2020-05-11 NOTE — ED AVS SNAPSHOT
[facility] Emergency Department  [address] [place] [address]  Phone:  [phone]  Fax:  [phone]                                    Yumi Bhatti   MRN: [id number]    Department:  [facility] Emergency Department   Date of Visit:  5/11/2020           After Visit Summary Signature Page    I have received my discharge instructions, and my questions have been answered. I have discussed any challenges I see with this plan with the nurse or doctor.    ..........................................................................................................................................  Patient/Patient Representative Signature      ..........................................................................................................................................  Patient Representative Print Name and Relationship to Patient    ..................................................               ................................................  Date                                   Time    ..........................................................................................................................................  Reviewed by Signature/Title    ...................................................              ..............................................  Date                                               Time          22EPIC Rev 08/18

## 2020-05-11 NOTE — TELEPHONE ENCOUNTER
Last read by Yumi Bhatti at 11:37 AM on 5/11/2020.     Patient is currently in the ER    Rica Lock MA

## 2020-05-11 NOTE — DISCHARGE INSTRUCTIONS
We did not find a worrisome cause for your abdominal pains, bloating, and diarrhea.  There is evidence for bladder infection with inflammation adjacent to the bladder and white cells in the urine.  You have a right inguinal hernia with soft tissue and no bowel caught in the hernia.  There is still some gastric contents in your stomach raising the possibility of gastroparesis or enteritis.  Stay with sips of clear liquids for the next couple of days and advance your diet only if symptoms improved.  Follow-up with your primary care provider as you may need to be scheduled for an upper endoscopy and colonoscopy.  Take Cipro 500 mg twice a day for 7 days, and Pyridium 100 mg 3 times a day as needed for spasms.

## 2020-05-11 NOTE — TELEPHONE ENCOUNTER
Patient has been seen in the ED for her symptoms since sending this mychart encounter.    Per ED AVS, patient was instructed to f/u with ES ASAP.    Replied to patient via [employer].  Zahra Landeros, GION, RN, PHN

## 2020-05-12 DIAGNOSIS — R10.11 RUQ ABDOMINAL PAIN: ICD-10-CM

## 2020-05-12 LAB
BACTERIA SPEC CULT: NORMAL
Lab: NORMAL
SPECIMEN SOURCE: NORMAL

## 2020-05-12 PROCEDURE — 87338 HPYLORI STOOL AG IA: CPT | Performed by: PHYSICIAN ASSISTANT

## 2020-05-12 NOTE — TELEPHONE ENCOUNTER
"Please review/advise - in hospital note they wrote \" Discuss a referral to gastroenterology for an upper endoscopy and possibly colonoscopy\"    Do you want patient to schedule a virtual visit for hospital follow up/to discuss this? Or are you willing to place referral? Pended in case. Last virtual visit 05/08.  "

## 2020-05-12 NOTE — TELEPHONE ENCOUNTER
See providers note as well saying she should get the H. Pylori test done and see GI.     Louisa Gallegos PA-C

## 2020-05-14 LAB — H PYLORI AG STL QL IA: NEGATIVE

## 2020-05-15 ENCOUNTER — TELEPHONE (OUTPATIENT)
Dept: FAMILY MEDICINE | Facility: OTHER | Age: 40
End: 2020-05-15

## 2020-05-15 NOTE — TELEPHONE ENCOUNTER
Spoke with [facility] they confirmed that they have received our referral for consultation, they will contact patient to schedule.  Will reach out to us if they need   Closing encounter  Rhonda Montgomery RT (R)

## 2020-05-15 NOTE — TELEPHONE ENCOUNTER
Patient called in stating that [facility] has not received the referral from [facility] yet. Please call the referral in to [phone]. Patient is having painful symptoms.

## 2020-05-15 NOTE — TELEPHONE ENCOUNTER
Reason for Call: Request for an order or referral:    Order or referral being requested: referral     Date needed: as soon as possible    Has the patient been seen by the PCP for this problem? YES    Additional comments: Referral need to be sent to [facility] phone# [phone] for a procedure not consult     Phone number Patient can be reached at:  Cell number on file:    Telephone Information:   Mobile [phone]       Best Time:  Anytime     Can we leave a detailed message on this number?  YES    Call taken on 5/15/2020 at 2:43 PM by Anjali Angela

## 2020-05-18 ENCOUNTER — TRANSFERRED RECORDS (OUTPATIENT)
Dept: HEALTH INFORMATION MANAGEMENT | Facility: CLINIC | Age: 40
End: 2020-05-18

## 2020-07-22 ENCOUNTER — OFFICE VISIT (OUTPATIENT)
Dept: OBGYN | Facility: CLINIC | Age: 40
End: 2020-07-22
Payer: COMMERCIAL

## 2020-07-22 VITALS
TEMPERATURE: 97.8 F | WEIGHT: 178 LBS | HEART RATE: 69 BPM | DIASTOLIC BLOOD PRESSURE: 66 MMHG | SYSTOLIC BLOOD PRESSURE: 102 MMHG | BODY MASS INDEX: 28.95 KG/M2

## 2020-07-22 DIAGNOSIS — N93.9 ABNORMAL UTERINE BLEEDING (AUB): Primary | ICD-10-CM

## 2020-07-22 LAB — TSH SERPL DL<=0.005 MIU/L-ACNC: 0.83 MU/L (ref 0.4–4)

## 2020-07-22 PROCEDURE — 36415 COLL VENOUS BLD VENIPUNCTURE: CPT | Performed by: ADVANCED PRACTICE MIDWIFE

## 2020-07-22 PROCEDURE — 99214 OFFICE O/P EST MOD 30 MIN: CPT | Performed by: ADVANCED PRACTICE MIDWIFE

## 2020-07-22 PROCEDURE — 84443 ASSAY THYROID STIM HORMONE: CPT | Performed by: ADVANCED PRACTICE MIDWIFE

## 2020-07-22 NOTE — PROGRESS NOTES
Yumi Bhatti is a 39 year old who presents to the clinic for evaluation of long standing pelvic pain and new development of spotting for about 10 days after her period ends.   Has recently lost about 100 lbs by doing low carb keto diet.  Is now on maintenance.   Lives the majority of the year in [facility] and would like to have testing done here.  Pelvic pain is primarily on the left but occasionally on the right.  Mostly in the week after her period.   Has had a history of irregular cycles in the past.  Has had 4 c/s and an ectopic which was on the left.   Tubal for BC. With a history of depo and LINDSAY.       Histories reviewed and updated  Past Medical History:   Diagnosis Date     Depressive disorder, not elsewhere classified     post-partum     Essential hypertension with goal blood pressure less than 140/90 2016     Hypertension      Hypothyroidism, unspecified type 2016     Migraines      Other complication of obstetrical surgical wounds, postpartum condition or complication 06/28/10    D/C 07/02/10     Pelvic peritoneal adhesions, female (postoperative) (postinfection)      Previous  delivery, delivered, with or without mention of antepartum condition 06/17/10    D/C 06/20/10     Unspecified septicemia(038.9) (H)      Past Surgical History:   Procedure Laterality Date     C/SECTION, LOW TRANSVERSE  2006     C/SECTION, LOW TRANSVERSE  2007    , Low Transverse     C/SECTION, LOW TRANSVERSE  06/17/10    , Low Transverse     C/SECTION, LOW TRANSVERSE  13    ERCS with TL     HC TOOTH EXTRACTION W/FORCEP      age 22.     LAPAROSCOPIC SALPINGECTOMY  3/1/2012    Procedure:LAPAROSCOPIC SALPINGECTOMY; Laparoscopic Left Salpingectomy; Surgeon:LUIS GARCIA; Location:[facility]     TUBAL LIGATION  14     TUBAL/ECTOPIC PREGNANCY       Social History     Socioeconomic History     Marital status:      Spouse name: jonny     Number of children: 3      Years of education: Not on file     Highest education level: Not on file   Occupational History     Occupation: homemaker     Employer: homemaker     Employer: HOMEMAKER   Social Needs     Financial resource strain: Not on file     Food insecurity     Worry: Not on file     Inability: Not on file     Transportation needs     Medical: Not on file     Non-medical: Not on file   Tobacco Use     Smoking status: Never Smoker     Smokeless tobacco: Never Used     Tobacco comment: no smokers in the household   Substance and Sexual Activity     Alcohol use: Yes     Comment: once or twice a month     Drug use: No     Sexual activity: Yes     Partners: Male     Birth control/protection: None     Comment: no protection   Lifestyle     Physical activity     Days per week: Not on file     Minutes per session: Not on file     Stress: Not on file   Relationships     Social connections     Talks on phone: Not on file     Gets together: Not on file     Attends Yazidi service: Not on file     Active member of club or organization: Not on file     Attends meetings of clubs or organizations: Not on file     Relationship status: Not on file     Intimate partner violence     Fear of current or ex partner: Not on file     Emotionally abused: Not on file     Physically abused: Not on file     Forced sexual activity: Not on file   Other Topics Concern      Service No     Blood Transfusions No     Caffeine Concern No     Comment: occasssionally     Occupational Exposure No     Hobby Hazards No     Sleep Concern No     Stress Concern No     Weight Concern No     Special Diet No     Back Care Yes     Comment: chronic     Exercise Yes     Comment: Advised exercising 30 minutes/day     Bike Helmet No     Seat Belt Yes     Self-Exams No     Parent/sibling w/ CABG, MI or angioplasty before 65F 55M? Not Asked   Social History Narrative    Lives in [place] with her  and 3 sons.    No smokers in the home.  No concerns about  domestic violence.  No indoor cats/kittens.     Family History   Problem Relation Age of Onset     Diabetes Mother         adult     Depression Mother      Hypertension Mother      Cerebrovascular Disease Mother         has had three strokes in  age 30's.     Alcohol/Drug Mother         alcoholic     Psychotic Disorder Mother      Alcohol/Drug Father         alcoholic and drug abuse     Depression Maternal Grandmother      Alcohol/Drug Maternal Grandmother      Diabetes Maternal Grandfather      Depression Maternal Grandfather      Arthritis Maternal Grandfather      Alcohol/Drug Maternal Grandfather      Depression Brother      Alcohol/Drug Brother         has seven half  brothers and all have alcohol and drug abuse.     Alcohol/Drug Paternal Grandmother      Alcohol/Drug Paternal Grandfather      Psychotic Disorder Brother           ROS: 10 point ROS neg other than the symptoms noted above in the HPI.    EXAM:  /66 (BP Location: Right arm, Patient Position: Sitting, Cuff Size: Adult Regular)   Pulse 69   Temp 97.8  F (36.6  C) (Oral)   Wt 80.7 kg (178 lb)   LMP 07/06/2020 (Exact Date)   BMI 28.95 kg/m    GENERAL:  Pleasant female, no acute distress  EYES: sclera clear  RESP: breathing unlabored  CV: extremities without edema  M/S: no gross deformities  Neuro:  normal strength and sensation  : PELVIC EXAM:  Vulva: No external lesions, normal hair distribution, no adenopathy, BUS WNL  Vagina: Moist, pink, no abnormal discharge, well rugated, no lesions  Rectal exam: deferred  Psych:  alert, with normal speech and behavior    EXAM: CT ABDOMEN PELVIS W/O CONTRAST  LOCATION: [facility]  DATE/TIME: 5/11/2020 5:05 AM   COMPARISON: None.  TECHNIQUE: CT scan of the abdomen and pelvis was performed without IV contrast. Multiplanar reformats were obtained. Dose reduction techniques were used.  CONTRAST: None.     FINDINGS:   LOWER CHEST: Minimal amount of bilateral dependent atelectasis left  greater than the right.     HEPATOBILIARY: Unremarkable.     PANCREAS: Unremarkable.     SPLEEN: Normal in size.     ADRENAL GLANDS: No adrenal nodule.     KIDNEYS/BLADDER: No hydronephrosis. No hydroureter. No distal obstructing ureteral calculi. Bladder partially filled. Minimal fat stranding adjacent to the bladder nonspecific.     BOWEL: Stomach partially filled with gastric contents. Appendix normal in caliber. No focal inflammatory change. No evidence of obstruction.     LYMPH NODES: Right inguinal hernia measures 3.2 x 1.7 cm with lobulated soft tissue.     VASCULATURE: Unremarkable.     PELVIC ORGANS: Uterus mildly enlarged. Phleboliths in the pelvis.     MUSCULOSKELETAL: Normal.                                                                      IMPRESSION:   1.  No CT evidence of appendicitis.  2.  No distal obstructing ureteral calculi.  3.  Recommend correlation to exclude inflammatory process due to cystitis, not definite and bladder not well distended.  4.  Right inguinal hernia contains soft tissue density which may represent nonspecific enlarged lymph node.  INDICATION: Abd pain, acute, generalized. Diarrhea. Right upper quadrant pain.    ASSESSMENT/PLAN:    (N93.9) Abnormal uterine bleeding (AUB)  (primary encounter diagnosis)  Comment: Plan: US Pelvic Complete w Transvaginal, TSH with         free T4 reflex          History positive for obesity, hypothyroidism, though not currently, HTN.  Plan scan to assess pelvic structures.   She is interested in an IUD.  Due to her risk factors would recommend EMB due to bleeding.  If she is not interested in an IUD needs an EMB, if she wants an IUD would combine the procedures.       Visit length 30 minutes was spent face to face with the patient today discussing her history, diagnosis, and follow-up plan as noted above.  Over 50% of the visit was spent in counseling and coordination of care.    Time noted does not include time required to complete  procedures.

## 2020-07-27 ENCOUNTER — ANCILLARY PROCEDURE (OUTPATIENT)
Dept: ULTRASOUND IMAGING | Facility: CLINIC | Age: 40
End: 2020-07-27
Attending: ADVANCED PRACTICE MIDWIFE
Payer: COMMERCIAL

## 2020-07-27 DIAGNOSIS — N93.9 ABNORMAL UTERINE BLEEDING (AUB): ICD-10-CM

## 2020-07-27 PROCEDURE — 76856 US EXAM PELVIC COMPLETE: CPT | Mod: 59

## 2020-07-27 PROCEDURE — 76830 TRANSVAGINAL US NON-OB: CPT

## 2020-07-27 NOTE — PROGRESS NOTES
"Subjective     Yumi Bhatti is a 39 year old female who presents to clinic today for the following health issues:    HPI     Patient needing medication for second toe on right foot - she states that it is \"like a fungus.\" \"So the toenail is thick and it's starting to become painful.\" Happened about 3 years ago in the same toe.  It resolved completely with oral lamisil.  She tolerated medication.  No trauma to the nail.       Rash  Onset: 1 week    Description:   Location: behind knees mostly, and arm  Character: raised, red, bumps  Itching (Pruritis): YES    Progression of Symptoms:  improving    Accompanying Signs & Symptoms:  Fever: no   Body aches or joint pain: no   Sore throat symptoms: no   Recent cold symptoms: no     History:   Previous similar rash: no     Precipitating factors:   Exposure to similar rash: no   New exposures: None   Recent travel: no     Alleviating factors:  NA    Therapies Tried and outcome: it has started going away  THIS HAS RESOLVED.     Notes she has had rectal itching since they came back from travel.  She has had this in the past.  Needs medication for it.     Bowel concerns:  - Is seeing OB/GYN regarding her irregular bleeding.  They recommended endometrial biopsy.  Per patient OB noted she didn't have to update her pap smear at this time But in  it says due.   - Has had long standing issues with chronic intermittent diarrhea.  Notes she had scopes in the past.  Diagnosed with IBS.  No constipation issues.  She does have issues without specific pattern with bloating.  She can drink fluids and feel bloated.  No increase in gas production.   - Sometimes gets random swelling in lower legs, it's mild, sometimes feels like she notes that her face is more swollen too but again short lived. She said that OB told her she could just start something for this with her primary care provider.     Patient Active Problem List   Diagnosis     Obesity     Sacroiliitis, not elsewhere " classified (H)     Hemorrhoids     Insomnia     CARDIOVASCULAR SCREENING; LDL GOAL LESS THAN 160     Health Care Home     Bulge of lumbar disc without myelopathy     CKD (chronic kidney disease) stage 2, GFR 60-89 ml/min     Hypertension goal BP (blood pressure) < 140/90     Migraine     Coccydynia     Major depression in complete remission (H)     Essential hypertension with goal blood pressure less than 140/90     Hypothyroidism, unspecified type     Obesity (BMI 35.0-39.9) with comorbidity (H)     Irritable bowel syndrome with diarrhea     Onychomycosis     Past Surgical History:   Procedure Laterality Date     C/SECTION, LOW TRANSVERSE  2006     C/SECTION, LOW TRANSVERSE  2007    , Low Transverse     C/SECTION, LOW TRANSVERSE  06/17/10    , Low Transverse     C/SECTION, LOW TRANSVERSE  13    ERCS with TL     HC TOOTH EXTRACTION W/FORCEP      age 22.     LAPAROSCOPIC SALPINGECTOMY  3/1/2012    Procedure:LAPAROSCOPIC SALPINGECTOMY; Laparoscopic Left Salpingectomy; Surgeon:LUIS GARCIA; Location:[facility]     TUBAL LIGATION  14     TUBAL/ECTOPIC PREGNANCY         Social History     Tobacco Use     Smoking status: Never Smoker     Smokeless tobacco: Never Used     Tobacco comment: no smokers in the household   Substance Use Topics     Alcohol use: Yes     Comment: once or twice a month     Family History   Problem Relation Age of Onset     Diabetes Mother         adult     Depression Mother      Hypertension Mother      Cerebrovascular Disease Mother         has had three strokes in  age 30's.     Alcohol/Drug Mother         alcoholic     Psychotic Disorder Mother      Alcohol/Drug Father         alcoholic and drug abuse     Depression Maternal Grandmother      Alcohol/Drug Maternal Grandmother      Diabetes Maternal Grandfather      Depression Maternal Grandfather      Arthritis Maternal Grandfather      Alcohol/Drug Maternal Grandfather      Depression Brother       Alcohol/Drug Brother         has seven half  brothers and all have alcohol and drug abuse.     Alcohol/Drug Paternal Grandmother      Alcohol/Drug Paternal Grandfather      Psychotic Disorder Brother          Current Outpatient Medications   Medication Sig Dispense Refill     albendazole (ALBENZA) 200 MG tablet Take 2 tablets by mouth once on empty stomach.  Repeat in 2 weeks. 4 tablet 0     dicyclomine (BENTYL) 10 MG capsule Take 1 capsule (10 mg) by mouth 4 times daily (before meals and nightly) 40 capsule 0     hydrochlorothiazide (HYDRODIURIL) 12.5 MG tablet Take 1 tablet (12.5 mg) by mouth daily 30 tablet 0     lisinopril (ZESTRIL) 10 MG tablet Take 1 tablet (10 mg) by mouth daily 90 tablet 3     terbinafine (LAMISIL) 250 MG tablet Take 1 tablet (250 mg) by mouth daily 90 tablet 0     triamcinolone (KENALOG) 0.1 % external ointment Apply topically 2 times daily 30 g 0     MOTRIN IB PO Take 600 mg by mouth every 6 hours as needed for moderate pain       Naproxen Sodium (ALEVE PO) Take 550 mg by mouth 2 times daily as needed for moderate pain       Allergies   Allergen Reactions     Macrobid [Nitrofuran Derivatives] GI Disturbance     Latex Rash       Reviewed and updated as needed this visit by Provider  Tobacco  Allergies  Meds  Problems  Med Hx  Surg Hx  Fam Hx         Review of Systems   Constitutional, HEENT, cardiovascular, pulmonary, GI, , musculoskeletal, neuro, skin, endocrine and psych systems are negative, except as otherwise noted.      Objective    /86   Pulse 70   Temp 97.9  F (36.6  C) (Temporal)   Resp 14   Wt 81.8 kg (180 lb 6.4 oz)   LMP 07/06/2020 (Exact Date)   BMI 29.34 kg/m    Body mass index is 29.34 kg/m .  Physical Exam   GENERAL: healthy, alert and no distress  RESP: lungs clear to auscultation - no rales, rhonchi or wheezes  CV: regular rate and rhythm, normal S1 S2, no S3 or S4, no murmur, click or rub, no peripheral edema and peripheral pulses strong  MS: no gross  "musculoskeletal defects noted, no edema  SKIN: no suspicious lesions or rashes, Left second toe there is nail hypertrophy  NEURO: Normal strength and tone, mentation intact and speech normal  PSYCH: mentation appears normal, affect normal/bright    Diagnostic Test Results:  Labs reviewed in Epic        Assessment & Plan       ICD-10-CM    1. Benign essential hypertension  I10 hydrochlorothiazide (HYDRODIURIL) 12.5 MG tablet     **Basic metabolic panel FUTURE 14d     Albumin Random Urine Quantitative with Creat Ratio     lisinopril (ZESTRIL) 10 MG tablet     CANCELED: Albumin Random Urine Quantitative with Creat Ratio   2. Hypertension goal BP (blood pressure) < 140/90  I10 hydrochlorothiazide (HYDRODIURIL) 12.5 MG tablet     **Basic metabolic panel FUTURE 14d     Albumin Random Urine Quantitative with Creat Ratio     lisinopril (ZESTRIL) 10 MG tablet     CANCELED: Albumin Random Urine Quantitative with Creat Ratio   3. CKD (chronic kidney disease) stage 2, GFR 60-89 ml/min  N18.2 hydrochlorothiazide (HYDRODIURIL) 12.5 MG tablet     **Basic metabolic panel FUTURE 14d     Albumin Random Urine Quantitative with Creat Ratio     CANCELED: Albumin Random Urine Quantitative with Creat Ratio   4. Onychomycosis  B35.1 terbinafine (LAMISIL) 250 MG tablet     Hepatic panel (Albumin, ALT, AST, Bili, Alk Phos, TP)   5. Irritable bowel syndrome with diarrhea  K58.0 dicyclomine (BENTYL) 10 MG capsule   6. CARDIOVASCULAR SCREENING; LDL GOAL LESS THAN 160  Z13.6 Lipid panel reflex to direct LDL Fasting     CANCELED: Lipid panel reflex to direct LDL Fasting   7. Pruritic rash  L28.2 triamcinolone (KENALOG) 0.1 % external ointment   8. Rectal itching  L29.0 albendazole (ALBENZA) 200 MG tablet          HTN:  - Stable on 10 mg lisinopril once daily.   - Due for labs.  Will update in the next couple of weeks.   - Elected to start on hydrochlorothiazide to see if this helps with her \"bloating\" and \"swelling\" issue - she notes this is what " OB recommended to her.  Discussed potential side effects.  Will monitor BP closely and recheck BMP.      Onychomycosis:  - Used and had good results with Lamisil in past  - Reminded of potential side effects  - Recommended Hepatic panel in 1 month.     IBS:  - previous diagnosis.   - Has chronic diarrhea, not certain if bloating is related to this.    - We will try Bentyl to see if this helps her symptoms.  Discussed potential side effects.   - Consider repeating endoscopy upper and lower in the future when back in [place] or if symptoms getting worse (last were at least 7 years ago she thinks).     Lipids:  - Due     Rash:  - Patient notes it has resolved, talked with Via MyChart after visit.  Can disregard Triamcinolone.     Rectal itching:  - Possible pinworms given her travel.  Given dose of Albendazole and repeat in 2 weeks. If not improving or worse recommend form evaluation with exam and O&P and pinworm testing as well.     Return in about 2 weeks (around 8/13/2020) for Medication Re-check, BP Recheck, Lab Work.     Options for treatment and follow-up care were reviewed with the patient and/or guardian. Patient and/or guardian engaged in the decision making process and verbalized understanding of the options discussed and agreed with the final plan.     Louisa Gallegos PA-C  [facility]

## 2020-07-30 ENCOUNTER — OFFICE VISIT (OUTPATIENT)
Dept: FAMILY MEDICINE | Facility: OTHER | Age: 40
End: 2020-07-30
Payer: COMMERCIAL

## 2020-07-30 VITALS
WEIGHT: 180.4 LBS | SYSTOLIC BLOOD PRESSURE: 110 MMHG | HEART RATE: 70 BPM | RESPIRATION RATE: 14 BRPM | BODY MASS INDEX: 29.34 KG/M2 | TEMPERATURE: 97.9 F | DIASTOLIC BLOOD PRESSURE: 86 MMHG

## 2020-07-30 DIAGNOSIS — B35.1 ONYCHOMYCOSIS: ICD-10-CM

## 2020-07-30 DIAGNOSIS — L28.2 PRURITIC RASH: ICD-10-CM

## 2020-07-30 DIAGNOSIS — K58.0 IRRITABLE BOWEL SYNDROME WITH DIARRHEA: ICD-10-CM

## 2020-07-30 DIAGNOSIS — I10 HYPERTENSION GOAL BP (BLOOD PRESSURE) < 140/90: ICD-10-CM

## 2020-07-30 DIAGNOSIS — Z13.6 CARDIOVASCULAR SCREENING; LDL GOAL LESS THAN 160: ICD-10-CM

## 2020-07-30 DIAGNOSIS — N18.2 CKD (CHRONIC KIDNEY DISEASE) STAGE 2, GFR 60-89 ML/MIN: ICD-10-CM

## 2020-07-30 DIAGNOSIS — L29.0 RECTAL ITCHING: ICD-10-CM

## 2020-07-30 DIAGNOSIS — I10 BENIGN ESSENTIAL HYPERTENSION: Primary | ICD-10-CM

## 2020-07-30 PROCEDURE — 99214 OFFICE O/P EST MOD 30 MIN: CPT | Performed by: PHYSICIAN ASSISTANT

## 2020-07-30 RX ORDER — HYDROCHLOROTHIAZIDE 12.5 MG/1
12.5 TABLET ORAL DAILY
Qty: 30 TABLET | Refills: 0 | Status: SHIPPED | OUTPATIENT
Start: 2020-07-30 | End: 2020-08-20

## 2020-07-30 RX ORDER — LISINOPRIL 10 MG/1
10 TABLET ORAL DAILY
Qty: 90 TABLET | Refills: 3 | Status: SHIPPED | OUTPATIENT
Start: 2020-07-30

## 2020-07-30 RX ORDER — ALBENDAZOLE 200 MG/1
TABLET, FILM COATED ORAL
Qty: 4 TABLET | Refills: 0 | Status: SHIPPED | OUTPATIENT
Start: 2020-07-30

## 2020-07-30 RX ORDER — TERBINAFINE HYDROCHLORIDE 250 MG/1
250 TABLET ORAL DAILY
Qty: 90 TABLET | Refills: 0 | Status: SHIPPED | OUTPATIENT
Start: 2020-07-30 | End: 2020-10-28

## 2020-07-30 RX ORDER — DICYCLOMINE HYDROCHLORIDE 10 MG/1
10 CAPSULE ORAL
Qty: 40 CAPSULE | Refills: 0 | Status: SHIPPED | OUTPATIENT
Start: 2020-07-30

## 2020-07-30 RX ORDER — TRIAMCINOLONE ACETONIDE 1 MG/G
OINTMENT TOPICAL 2 TIMES DAILY
Qty: 30 G | Refills: 0 | Status: SHIPPED | OUTPATIENT
Start: 2020-07-30

## 2020-07-30 ASSESSMENT — PAIN SCALES - GENERAL: PAINLEVEL: NO PAIN (0)

## 2020-08-10 ENCOUNTER — TELEPHONE (OUTPATIENT)
Dept: FAMILY MEDICINE | Facility: OTHER | Age: 40
End: 2020-08-10

## 2020-08-18 ENCOUNTER — ALLIED HEALTH/NURSE VISIT (OUTPATIENT)
Dept: FAMILY MEDICINE | Facility: OTHER | Age: 40
End: 2020-08-18
Payer: COMMERCIAL

## 2020-08-18 VITALS — DIASTOLIC BLOOD PRESSURE: 82 MMHG | HEART RATE: 68 BPM | SYSTOLIC BLOOD PRESSURE: 125 MMHG

## 2020-08-18 DIAGNOSIS — B35.1 ONYCHOMYCOSIS: ICD-10-CM

## 2020-08-18 DIAGNOSIS — I10 BENIGN ESSENTIAL HYPERTENSION: ICD-10-CM

## 2020-08-18 DIAGNOSIS — I10 HYPERTENSION GOAL BP (BLOOD PRESSURE) < 140/90: ICD-10-CM

## 2020-08-18 DIAGNOSIS — N18.2 CKD (CHRONIC KIDNEY DISEASE) STAGE 2, GFR 60-89 ML/MIN: ICD-10-CM

## 2020-08-18 DIAGNOSIS — Z01.30 BP CHECK: Primary | ICD-10-CM

## 2020-08-18 LAB
ALBUMIN SERPL-MCNC: 3.6 G/DL (ref 3.4–5)
ALP SERPL-CCNC: 26 U/L (ref 40–150)
ALT SERPL W P-5'-P-CCNC: 31 U/L (ref 0–50)
ANION GAP SERPL CALCULATED.3IONS-SCNC: 4 MMOL/L (ref 3–14)
AST SERPL W P-5'-P-CCNC: 14 U/L (ref 0–45)
BILIRUB DIRECT SERPL-MCNC: 0.1 MG/DL (ref 0–0.2)
BILIRUB SERPL-MCNC: 0.5 MG/DL (ref 0.2–1.3)
BUN SERPL-MCNC: 21 MG/DL (ref 7–30)
CALCIUM SERPL-MCNC: 9 MG/DL (ref 8.5–10.1)
CHLORIDE SERPL-SCNC: 106 MMOL/L (ref 94–109)
CO2 SERPL-SCNC: 29 MMOL/L (ref 20–32)
CREAT SERPL-MCNC: 0.98 MG/DL (ref 0.52–1.04)
GFR SERPL CREATININE-BSD FRML MDRD: 73 ML/MIN/{1.73_M2}
GLUCOSE SERPL-MCNC: 95 MG/DL (ref 70–99)
POTASSIUM SERPL-SCNC: 4.1 MMOL/L (ref 3.4–5.3)
PROT SERPL-MCNC: 7 G/DL (ref 6.8–8.8)
SODIUM SERPL-SCNC: 139 MMOL/L (ref 133–144)

## 2020-08-18 PROCEDURE — 80048 BASIC METABOLIC PNL TOTAL CA: CPT | Performed by: PHYSICIAN ASSISTANT

## 2020-08-18 PROCEDURE — 80076 HEPATIC FUNCTION PANEL: CPT | Performed by: PHYSICIAN ASSISTANT

## 2020-08-18 PROCEDURE — 99207 ZZC NO CHARGE NURSE ONLY: CPT

## 2020-08-18 PROCEDURE — 36415 COLL VENOUS BLD VENIPUNCTURE: CPT | Performed by: PHYSICIAN ASSISTANT

## 2020-08-18 NOTE — PROGRESS NOTES
Yumi Bhatti is a 39 year old patient who comes in today for a Blood Pressure check.  Initial BP:  /82   Pulse 68      68  Disposition: follow-up as previously indicated by provider and results routed to provider    Patient is almost out of hydrochlorothiazide    Shonda Kasper CMA

## 2020-08-19 DIAGNOSIS — I10 HYPERTENSION GOAL BP (BLOOD PRESSURE) < 140/90: ICD-10-CM

## 2020-08-19 DIAGNOSIS — N18.2 CKD (CHRONIC KIDNEY DISEASE) STAGE 2, GFR 60-89 ML/MIN: ICD-10-CM

## 2020-08-19 DIAGNOSIS — I10 BENIGN ESSENTIAL HYPERTENSION: ICD-10-CM

## 2020-08-19 NOTE — TELEPHONE ENCOUNTER
"Routing refill request to provider for review/approval because:  New medication for pt at last OV, please review    hydrochlorothiazide  Last Written Prescription Date:  7/30/2020  Last Fill Quantity: 30,  # refills: 0   Last office visit: 7/30/2020 with prescribing provider:  El   Future Office Visit:      Requested Prescriptions   Pending Prescriptions Disp Refills     hydrochlorothiazide (HYDRODIURIL) 12.5 MG tablet [Pharmacy Med Name: HYDROCHLOROTHIAZIDE 12.5MG TABS] 30 tablet 0     Sig: TAKE ONE TABLET BY MOUTH ONCE DAILY       Diuretics (Including Combos) Protocol Passed - 8/19/2020  2:28 PM        Passed - Blood pressure under 140/90 in past 12 months     BP Readings from Last 3 Encounters:   08/18/20 125/82   07/30/20 110/86   07/22/20 102/66                 Passed - Recent (12 mo) or future (30 days) visit within the authorizing provider's specialty     Patient has had an office visit with the authorizing provider or a provider within the authorizing providers department within the previous 12 mos or has a future within next 30 days. See \"Patient Info\" tab in inbasket, or \"Choose Columns\" in Meds & Orders section of the refill encounter.              Passed - Medication is active on med list        Passed - Patient is age 18 or older        Passed - No active pregancy on record        Passed - Normal serum creatinine on file in past 12 months     Recent Labs   Lab Test 08/18/20  0950   CR 0.98              Passed - Normal serum potassium on file in past 12 months     Recent Labs   Lab Test 08/18/20  0950   POTASSIUM 4.1                    Passed - Normal serum sodium on file in past 12 months     Recent Labs   Lab Test 08/18/20  0950                 Passed - No positive pregnancy test in past 12 months           Jessica King RN on 8/19/2020 at 6:41 PM    "

## 2020-08-20 RX ORDER — HYDROCHLOROTHIAZIDE 12.5 MG/1
TABLET ORAL
Qty: 90 TABLET | Refills: 0 | Status: SHIPPED | OUTPATIENT
Start: 2020-08-20 | End: 2020-11-03

## 2020-11-03 DIAGNOSIS — I10 HYPERTENSION GOAL BP (BLOOD PRESSURE) < 140/90: ICD-10-CM

## 2020-11-03 DIAGNOSIS — I10 BENIGN ESSENTIAL HYPERTENSION: ICD-10-CM

## 2020-11-03 DIAGNOSIS — N18.2 CKD (CHRONIC KIDNEY DISEASE) STAGE 2, GFR 60-89 ML/MIN: ICD-10-CM

## 2020-11-04 RX ORDER — HYDROCHLOROTHIAZIDE 12.5 MG/1
12.5 TABLET ORAL DAILY
Qty: 90 TABLET | Refills: 1 | Status: SHIPPED | OUTPATIENT
Start: 2020-11-04

## 2020-11-13 ENCOUNTER — MYC MEDICAL ADVICE (OUTPATIENT)
Dept: FAMILY MEDICINE | Facility: OTHER | Age: 40
End: 2020-11-13

## 2020-11-17 NOTE — PROGRESS NOTES
"Subjective     Yumi Bhatti is a 40 year old female who presents to clinic today for the following health issues:    HPI      Needs certification fit to fly for [facility] - they fly out on 11/23/2020  Needs COVID testing prior to flight.     Review of Systems   Constitutional, HEENT, cardiovascular, pulmonary, gi and gu systems are negative, except as otherwise noted.      Objective    /76   Ht 1.702 m (5' 7\")   Wt 82.8 kg (182 lb 9.6 oz)   BMI 28.60 kg/m    Body mass index is 28.6 kg/m .  Physical Exam   GENERAL: healthy, alert and no distress  EYES: Eyes grossly normal to inspection, PERRL and conjunctivae and sclerae normal  MS: no gross musculoskeletal defects noted, no edema  NEURO: Normal strength and tone, mentation intact and speech normal  PSYCH: mentation appears normal, affect normal/bright    No results found for this or any previous visit (from the past 24 hour(s)).        Assessment & Plan     Yumi was seen today for forms.    Diagnoses and all orders for this visit:    Administrative encounter  -     Asymptomatic COVID-19 Virus (Coronavirus) by PCR        Letter written.   She will access results from [facility].  She is up to date on vaccinations, declines influenza.   Due for physical upon return to [facility] (likely Summer 2022)    Return in about 1 year (around 11/20/2021) for Physical Exam.    Options for treatment and follow-up care were reviewed with the patient and/or guardian. Patient and/or guardian engaged in the decision making process and verbalized understanding of the options discussed and agreed with the final plan.    Louisa Gallegos PA-C  [facility]"

## 2020-11-20 ENCOUNTER — OFFICE VISIT (OUTPATIENT)
Dept: FAMILY MEDICINE | Facility: OTHER | Age: 40
End: 2020-11-20
Payer: COMMERCIAL

## 2020-11-20 VITALS
WEIGHT: 182.6 LBS | BODY MASS INDEX: 28.66 KG/M2 | DIASTOLIC BLOOD PRESSURE: 76 MMHG | SYSTOLIC BLOOD PRESSURE: 126 MMHG | HEIGHT: 67 IN

## 2020-11-20 DIAGNOSIS — Z02.9 ADMINISTRATIVE ENCOUNTER: Primary | ICD-10-CM

## 2020-11-20 PROCEDURE — 99212 OFFICE O/P EST SF 10 MIN: CPT | Performed by: PHYSICIAN ASSISTANT

## 2020-11-20 PROCEDURE — U0003 INFECTIOUS AGENT DETECTION BY NUCLEIC ACID (DNA OR RNA); SEVERE ACUTE RESPIRATORY SYNDROME CORONAVIRUS 2 (SARS-COV-2) (CORONAVIRUS DISEASE [COVID-19]), AMPLIFIED PROBE TECHNIQUE, MAKING USE OF HIGH THROUGHPUT TECHNOLOGIES AS DESCRIBED BY CMS-2020-01-R: HCPCS | Performed by: PHYSICIAN ASSISTANT

## 2020-11-20 ASSESSMENT — MIFFLIN-ST. JEOR: SCORE: 1535.9

## 2020-11-20 NOTE — LETTER
[facility]  [address]  [place] [address]  Phone: [phone]    November 21, 2020      Yumi Bhatti  [address]  [place] [address]      RE: Yumi Bhatti    To Whom it May Concern:     I have examined Yumi Bhatti with date of birth of  1980  for  her  Fitness to Fly and travel abroad.  She  is healthy with no pre-existing medical conditions.  No negatives such as :  1)       No fever  2)       No shortness of breath   3)       No concerning symptoms for Covid-19  She has had an otherwise normal physical exam.  I have found she is Fit to Fly as a normal seated passenger with no special requirements.  Yumi understands the risks involved with air travel and accepts full responsibility for herself and travels at her own risk.       ____________________                                                   ___________________  Name of physician                                                             Medical License                                                                        _____________________                                                 ____________________  Name of Patient                                                                 Signature of Patient      Please contact me for questions or concerns.      Sincerely,        Louisa Gallegos PA-C

## 2020-11-20 NOTE — LETTER
[facility]  [address]  [place] [address]  Phone: [phone]    November 20, 2020        Yumi Bhatti  [address]  [place] [address]    RE: Yumi Bhatti    Date:  11/20/20         To Whom it May Concern:     I have examined Yumi Bhatti with date of birth of  1980  for  his/her  Fitness to Fly and travel abroad.  He/ She  is healthy with no pre-existing medical conditions.  No negatives such as :  1)       No fever  2)       No shortness of breath   3)       No concerning symptoms for Covid-19  She has had an otherwise normal physical exam.  I have found he/she is Fit to Fly as a normal seated passenger with no special requirements.  Yumi understands the risks involved with air travel and accepts full responsibility for himself/herself and travels at his/her own risk.     ____________________                                                   ___________________  Name of physician                                                             Medical License                                                                        _____________________                                                 ____________________  Name of Patient                                                                 Signature of Patient      Please contact me for questions or concerns.    Sincerely,        Louisa Gallegos PA-C

## 2020-11-21 LAB
SARS-COV-2 RNA SPEC QL NAA+PROBE: NOT DETECTED
SPECIMEN SOURCE: NORMAL

## 2021-01-09 ENCOUNTER — HEALTH MAINTENANCE LETTER (OUTPATIENT)
Age: 41
End: 2021-01-09

## 2021-10-13 ENCOUNTER — TELEPHONE (OUTPATIENT)
Dept: FAMILY MEDICINE | Facility: OTHER | Age: 41
End: 2021-10-13
Payer: COMMERCIAL

## 2021-10-13 NOTE — TELEPHONE ENCOUNTER
Patient Quality Outreach Summary      Summary:    Patient is due/failing the following:   Cervical Cancer Screening - PAP Needed and Physical     Type of outreach:    Phone, left message for patient/parent to call back.    Questions for provider review:    None                                                                                                                    Lien Pro CMA       Chart routed to Care Team.

## 2021-10-23 ENCOUNTER — HEALTH MAINTENANCE LETTER (OUTPATIENT)
Age: 41
End: 2021-10-23

## 2022-02-12 ENCOUNTER — HEALTH MAINTENANCE LETTER (OUTPATIENT)
Age: 42
End: 2022-02-12

## 2022-10-09 ENCOUNTER — HEALTH MAINTENANCE LETTER (OUTPATIENT)
Age: 42
End: 2022-10-09

## 2023-02-14 ENCOUNTER — MYC MEDICAL ADVICE (OUTPATIENT)
Dept: FAMILY MEDICINE | Facility: OTHER | Age: 43
End: 2023-02-14
Payer: COMMERCIAL

## 2023-03-25 ENCOUNTER — HEALTH MAINTENANCE LETTER (OUTPATIENT)
Age: 43
End: 2023-03-25

## 2024-03-16 ENCOUNTER — HEALTH MAINTENANCE LETTER (OUTPATIENT)
Age: 44
End: 2024-03-16

## 2024-05-25 ENCOUNTER — HEALTH MAINTENANCE LETTER (OUTPATIENT)
Age: 44
End: 2024-05-25

## 2024-06-07 NOTE — PROGRESS NOTES
"  Assessment & Plan     Graves disease  Diagnosed in [facility], managed by a primary there thus far   - Adult Endocrinology  Referral; Future  - TSH; Future  - T4, free; Future  - Thyroid peroxidase antibody; Future  - Anti thyroglobulin antibody; Future  Will repeat all labs. Has been treated with methimazole and most recently is on 15 mg. Was taken off due to elevated TSH although pt was having severe symptoms of hyperthyroid and has been back on 15 mg since Sunday.  Will also repeat antibody testing for confirmation.  Endo referral sent, is going back to [facility] in August.   Has had US without abnormality although no ACEVES test yet.  Needs quick referral and follow up with endo with plan for management in place before travel    Travel advice encounter  High risk travel  - Ova and Parasite Exam Routine; Future  - Ova and Parasite Exam Routine          BMI  Estimated body mass index is 28.35 kg/m  as calculated from the following:    Height as of this encounter: 1.702 m (5' 7\").    Weight as of this encounter: 82.1 kg (181 lb).   Weight management plan: defer today       Follow up with endocrine    Subjective   Yumi is a 43 year old, presenting for the following health issues:  Referral and Parasite  15 mg methimazole on Sunday     Tsh- 6.67 5/21   32.1 3/26          6/13/2024    11:03 AM   Additional Questions   Roomed by Rafita Eller MA   Accompanied by Self       Answers submitted by the patient for this visit:  Patient Health Questionnaire (Submitted on 6/13/2024)  If you checked off any problems, how difficult have these problems made it for you to do your work, take care of things at home, or get along with other people?: Somewhat difficult  PHQ9 TOTAL SCORE: 4      History of Present Illness       Reason for visit:  Thyroid  Symptom onset:  More than a month  Symptom intensity:  Moderate  Symptom progression:  Worsening  Had these symptoms before:  Yes  Has tried/received treatment for these symptoms:  " "Yes  Previous treatment was successful:  Yes  Prior treatment description:  Medicine    She eats 2-3 servings of fruits and vegetables daily.She consumes 0 sweetened beverage(s) daily.She exercises with enough effort to increase her heart rate 9 or less minutes per day.  She exercises with enough effort to increase her heart rate 3 or less days per week.   She is taking medications regularly.         Review of Systems  Constitutional, neuro, ENT, endocrine, pulmonary, cardiac, gastrointestinal, genitourinary, musculoskeletal, integument and psychiatric systems are negative, except as otherwise noted.        Objective    BP (!) 141/86   Pulse 75   Temp 98.4  F (36.9  C) (Tympanic)   Resp 14   Ht 1.702 m (5' 7\")   Wt 82.1 kg (181 lb)   SpO2 100%   Breastfeeding No   BMI 28.35 kg/m    Body mass index is 28.35 kg/m .      Physical Exam   GENERAL: alert and no distress  EYES: Eyes grossly normal to inspection, PERRL and conjunctivae and sclerae normal  SKIN: no suspicious lesions or rashes  NEURO:  mentation intact and speech normal  PSYCH: mentation appears normal, affect normal/bright          Signed Electronically by: Bulmaro Higuera PA-C          "

## 2024-06-13 ENCOUNTER — OFFICE VISIT (OUTPATIENT)
Dept: FAMILY MEDICINE | Facility: CLINIC | Age: 44
End: 2024-06-13
Payer: MEDICAID

## 2024-06-13 VITALS
BODY MASS INDEX: 28.41 KG/M2 | HEIGHT: 67 IN | DIASTOLIC BLOOD PRESSURE: 86 MMHG | WEIGHT: 181 LBS | SYSTOLIC BLOOD PRESSURE: 139 MMHG | RESPIRATION RATE: 14 BRPM | HEART RATE: 75 BPM | OXYGEN SATURATION: 100 % | TEMPERATURE: 98.4 F

## 2024-06-13 DIAGNOSIS — E05.00 GRAVES DISEASE: Primary | ICD-10-CM

## 2024-06-13 DIAGNOSIS — Z71.84 TRAVEL ADVICE ENCOUNTER: ICD-10-CM

## 2024-06-13 PROCEDURE — 99000 SPECIMEN HANDLING OFFICE-LAB: CPT | Performed by: PHYSICIAN ASSISTANT

## 2024-06-13 PROCEDURE — 99203 OFFICE O/P NEW LOW 30 MIN: CPT | Performed by: PHYSICIAN ASSISTANT

## 2024-06-13 PROCEDURE — 86800 THYROGLOBULIN ANTIBODY: CPT | Performed by: PHYSICIAN ASSISTANT

## 2024-06-13 PROCEDURE — 86376 MICROSOMAL ANTIBODY EACH: CPT | Performed by: PHYSICIAN ASSISTANT

## 2024-06-13 PROCEDURE — 84445 ASSAY OF TSI GLOBULIN: CPT | Mod: 90 | Performed by: PHYSICIAN ASSISTANT

## 2024-06-13 PROCEDURE — 36415 COLL VENOUS BLD VENIPUNCTURE: CPT | Performed by: PHYSICIAN ASSISTANT

## 2024-06-13 PROCEDURE — 84443 ASSAY THYROID STIM HORMONE: CPT | Performed by: PHYSICIAN ASSISTANT

## 2024-06-13 PROCEDURE — 84439 ASSAY OF FREE THYROXINE: CPT | Performed by: PHYSICIAN ASSISTANT

## 2024-06-13 ASSESSMENT — PAIN SCALES - GENERAL: PAINLEVEL: NO PAIN (0)

## 2024-06-13 ASSESSMENT — PATIENT HEALTH QUESTIONNAIRE - PHQ9
10. IF YOU CHECKED OFF ANY PROBLEMS, HOW DIFFICULT HAVE THESE PROBLEMS MADE IT FOR YOU TO DO YOUR WORK, TAKE CARE OF THINGS AT HOME, OR GET ALONG WITH OTHER PEOPLE: SOMEWHAT DIFFICULT
SUM OF ALL RESPONSES TO PHQ QUESTIONS 1-9: 4
SUM OF ALL RESPONSES TO PHQ QUESTIONS 1-9: 4

## 2024-06-14 LAB
T4 FREE SERPL-MCNC: 1.38 NG/DL (ref 0.9–1.7)
THYROGLOB AB SERPL IA-ACNC: 213 IU/ML
THYROPEROXIDASE AB SERPL-ACNC: 133 IU/ML
TSH SERPL DL<=0.005 MIU/L-ACNC: 1.08 UIU/ML (ref 0.3–4.2)

## 2024-06-19 LAB — TSI SER-ACNC: <1 TSI INDEX

## 2024-06-25 ENCOUNTER — APPOINTMENT (OUTPATIENT)
Dept: LAB | Facility: OTHER | Age: 44
End: 2024-06-25
Payer: MEDICAID

## 2024-06-25 PROCEDURE — 87209 SMEAR COMPLEX STAIN: CPT | Performed by: PHYSICIAN ASSISTANT

## 2024-06-25 PROCEDURE — 99213 OFFICE O/P EST LOW 20 MIN: CPT | Mod: 25 | Performed by: PHYSICIAN ASSISTANT

## 2024-06-25 PROCEDURE — 99401 PREV MED CNSL INDIV APPRX 15: CPT | Performed by: PHYSICIAN ASSISTANT

## 2024-06-25 PROCEDURE — 87177 OVA AND PARASITES SMEARS: CPT | Performed by: PHYSICIAN ASSISTANT

## 2024-06-26 LAB — O+P STL MICRO: NEGATIVE

## 2024-08-08 ENCOUNTER — MYC MEDICAL ADVICE (OUTPATIENT)
Dept: FAMILY MEDICINE | Facility: CLINIC | Age: 44
End: 2024-08-08
Payer: MEDICAID

## 2024-08-08 DIAGNOSIS — E05.00 GRAVES DISEASE: Primary | ICD-10-CM

## 2024-08-09 RX ORDER — METHIMAZOLE 5 MG/1
5 TABLET ORAL DAILY
Qty: 90 TABLET | Refills: 0 | Status: SHIPPED | OUTPATIENT
Start: 2024-08-09

## 2024-08-09 NOTE — TELEPHONE ENCOUNTER
Routing to provider - Pt was seen in office 6/13/24. Pt requesting Methimazole prior to travel. Please see mychart message from pt and advise.    Thank you - Shonda Moran, GION, RN

## 2024-12-12 NOTE — TELEPHONE ENCOUNTER
ES patient     BP is still elevated. Last notes say she is taking Zestril but I can't find order. ES note says Lisinopril 20 mg.   Please clarify with patient. She will need to increase dose. Find out what she is taking and route back.     She should NOT take potassium supplement with any ACE inhibitor such as Lisinopril or Zestril has can increase potassium too far and cause heart issues.     Carmelo Calabrese PA-C  [facility]        Refill sent.

## 2025-02-03 ENCOUNTER — TELEPHONE (OUTPATIENT)
Dept: FAMILY MEDICINE | Facility: CLINIC | Age: 45
End: 2025-02-03
Payer: MEDICAID

## 2025-02-03 NOTE — TELEPHONE ENCOUNTER
Patient Quality Outreach    Patient is due for the following:   Cervical Cancer Screening - PAP Needed    Action(s) Taken:   Patient has upcoming appointment, these items will be addressed at that time.    Type of outreach:    Chart review performed, no outreach needed.    Questions for provider review:    None           Rafita Eller MA

## 2025-06-14 ENCOUNTER — HEALTH MAINTENANCE LETTER (OUTPATIENT)
Age: 45
End: 2025-06-14